# Patient Record
Sex: MALE | Race: WHITE
[De-identification: names, ages, dates, MRNs, and addresses within clinical notes are randomized per-mention and may not be internally consistent; named-entity substitution may affect disease eponyms.]

---

## 2017-01-16 ENCOUNTER — HOSPITAL ENCOUNTER (OUTPATIENT)
Dept: HOSPITAL 17 - PLAB | Age: 82
End: 2017-01-16
Attending: FAMILY MEDICINE
Payer: MEDICARE

## 2017-01-16 DIAGNOSIS — Z79.899: ICD-10-CM

## 2017-01-16 DIAGNOSIS — R53.83: ICD-10-CM

## 2017-01-16 DIAGNOSIS — I10: Primary | ICD-10-CM

## 2017-01-16 DIAGNOSIS — E03.8: ICD-10-CM

## 2017-01-16 DIAGNOSIS — E78.2: ICD-10-CM

## 2017-01-16 DIAGNOSIS — E55.9: ICD-10-CM

## 2017-01-16 LAB
ALP SERPL-CCNC: 132 U/L (ref 45–117)
ALT SERPL-CCNC: 17 U/L (ref 12–78)
ANION GAP SERPL CALC-SCNC: 8 MEQ/L (ref 5–15)
AST SERPL-CCNC: 17 U/L (ref 15–37)
BASOPHILS # BLD AUTO: 0 TH/MM3 (ref 0–0.2)
BASOPHILS NFR BLD: 0.2 % (ref 0–2)
BILIRUB SERPL-MCNC: 0.9 MG/DL (ref 0.2–1)
BUN SERPL-MCNC: 17 MG/DL (ref 7–18)
CHLORIDE SERPL-SCNC: 102 MEQ/L (ref 98–107)
EOSINOPHIL # BLD: 0.3 TH/MM3 (ref 0–0.4)
EOSINOPHIL NFR BLD: 3 % (ref 0–4)
ERYTHROCYTE [DISTWIDTH] IN BLOOD BY AUTOMATED COUNT: 14.2 % (ref 11.6–17.2)
GFR SERPLBLD BASED ON 1.73 SQ M-ARVRAT: 76 ML/MIN (ref 89–?)
HCO3 BLD-SCNC: 30.6 MEQ/L (ref 21–32)
HCT VFR BLD CALC: 42 % (ref 39–51)
HDLC SERPL-MCNC: 69.5 MG/DL (ref 40–60)
HEMO FLAGS: (no result)
HEMOGLOBIN A1A: 1 %
HEMOGLOBIN A1B: 1.5 %
HEMOGLOBIN AO: 85.6 %
HEMOGLOBIN LA1C: 2 %
HEMOGLOBIN P3: 3.7 %
LDLC SERPL-MCNC: 65 MG/DL (ref 0–99)
LYMPHOCYTES # BLD AUTO: 2.3 TH/MM3 (ref 1–4.8)
LYMPHOCYTES NFR BLD AUTO: 25.5 % (ref 9–44)
MCH RBC QN AUTO: 34.1 PG (ref 27–34)
MCHC RBC AUTO-ENTMCNC: 35.3 % (ref 32–36)
MCV RBC AUTO: 96.8 FL (ref 80–100)
MONOCYTES NFR BLD: 8 % (ref 0–8)
NEUTROPHILS # BLD AUTO: 5.7 TH/MM3 (ref 1.8–7.7)
NEUTROPHILS NFR BLD AUTO: 63.3 % (ref 16–70)
PLATELET # BLD: 205 TH/MM3 (ref 150–450)
POTASSIUM SERPL-SCNC: 4.5 MEQ/L (ref 3.5–5.1)
RBC # BLD AUTO: 4.34 MIL/MM3 (ref 4.5–5.9)
SODIUM SERPL-SCNC: 141 MEQ/L (ref 136–145)
T4 SERPL-MCNC: 7.8 MCG/DL (ref 4.5–12.1)
WBC # BLD AUTO: 9 TH/MM3 (ref 4–11)

## 2017-01-16 PROCEDURE — 84480 ASSAY TRIIODOTHYRONINE (T3): CPT

## 2017-01-16 PROCEDURE — 80053 COMPREHEN METABOLIC PANEL: CPT

## 2017-01-16 PROCEDURE — 84443 ASSAY THYROID STIM HORMONE: CPT

## 2017-01-16 PROCEDURE — 83036 HEMOGLOBIN GLYCOSYLATED A1C: CPT

## 2017-01-16 PROCEDURE — 36415 COLL VENOUS BLD VENIPUNCTURE: CPT

## 2017-01-16 PROCEDURE — 82306 VITAMIN D 25 HYDROXY: CPT

## 2017-01-16 PROCEDURE — 84436 ASSAY OF TOTAL THYROXINE: CPT

## 2017-01-16 PROCEDURE — 85025 COMPLETE CBC W/AUTO DIFF WBC: CPT

## 2017-01-16 PROCEDURE — 80061 LIPID PANEL: CPT

## 2017-04-18 ENCOUNTER — HOSPITAL ENCOUNTER (OUTPATIENT)
Dept: HOSPITAL 17 - PLAB | Age: 82
End: 2017-04-18
Attending: FAMILY MEDICINE
Payer: MEDICARE

## 2017-04-18 DIAGNOSIS — E78.2: ICD-10-CM

## 2017-04-18 DIAGNOSIS — E03.8: ICD-10-CM

## 2017-04-18 DIAGNOSIS — I10: Primary | ICD-10-CM

## 2017-04-18 DIAGNOSIS — R53.83: ICD-10-CM

## 2017-04-18 DIAGNOSIS — E55.9: ICD-10-CM

## 2017-04-18 DIAGNOSIS — Z79.899: ICD-10-CM

## 2017-04-18 LAB
ALP SERPL-CCNC: 109 U/L (ref 45–117)
ALT SERPL-CCNC: 21 U/L (ref 12–78)
ANION GAP SERPL CALC-SCNC: 6 MEQ/L (ref 5–15)
AST SERPL-CCNC: 17 U/L (ref 15–37)
BASOPHILS # BLD AUTO: 0.1 TH/MM3 (ref 0–0.2)
BASOPHILS NFR BLD: 0.9 % (ref 0–2)
BILIRUB SERPL-MCNC: 1.2 MG/DL (ref 0.2–1)
BUN SERPL-MCNC: 11 MG/DL (ref 7–18)
CHLORIDE SERPL-SCNC: 104 MEQ/L (ref 98–107)
EOSINOPHIL # BLD: 0.2 TH/MM3 (ref 0–0.4)
EOSINOPHIL NFR BLD: 2.4 % (ref 0–4)
ERYTHROCYTE [DISTWIDTH] IN BLOOD BY AUTOMATED COUNT: 13.9 % (ref 11.6–17.2)
GFR SERPLBLD BASED ON 1.73 SQ M-ARVRAT: 90 ML/MIN (ref 89–?)
HCO3 BLD-SCNC: 29.7 MEQ/L (ref 21–32)
HCT VFR BLD CALC: 41.3 % (ref 39–51)
HDLC SERPL-MCNC: 59.6 MG/DL (ref 40–60)
HEMO FLAGS: (no result)
LDLC SERPL-MCNC: 56 MG/DL (ref 0–99)
LYMPHOCYTES # BLD AUTO: 2.3 TH/MM3 (ref 1–4.8)
LYMPHOCYTES NFR BLD AUTO: 30.3 % (ref 9–44)
MCH RBC QN AUTO: 33.2 PG (ref 27–34)
MCHC RBC AUTO-ENTMCNC: 35.2 % (ref 32–36)
MCV RBC AUTO: 94.3 FL (ref 80–100)
MONOCYTES NFR BLD: 10 % (ref 0–8)
NEUTROPHILS # BLD AUTO: 4.4 TH/MM3 (ref 1.8–7.7)
NEUTROPHILS NFR BLD AUTO: 56.4 % (ref 16–70)
PLATELET # BLD: 186 TH/MM3 (ref 150–450)
POTASSIUM SERPL-SCNC: 4.1 MEQ/L (ref 3.5–5.1)
RBC # BLD AUTO: 4.38 MIL/MM3 (ref 4.5–5.9)
SODIUM SERPL-SCNC: 140 MEQ/L (ref 136–145)
T4 SERPL-MCNC: 7.2 MCG/DL (ref 4.5–12.1)
WBC # BLD AUTO: 7.7 TH/MM3 (ref 4–11)

## 2017-04-18 PROCEDURE — 80061 LIPID PANEL: CPT

## 2017-04-18 PROCEDURE — 82306 VITAMIN D 25 HYDROXY: CPT

## 2017-04-18 PROCEDURE — 80053 COMPREHEN METABOLIC PANEL: CPT

## 2017-04-18 PROCEDURE — 84436 ASSAY OF TOTAL THYROXINE: CPT

## 2017-04-18 PROCEDURE — 84443 ASSAY THYROID STIM HORMONE: CPT

## 2017-04-18 PROCEDURE — 84480 ASSAY TRIIODOTHYRONINE (T3): CPT

## 2017-04-18 PROCEDURE — 36415 COLL VENOUS BLD VENIPUNCTURE: CPT

## 2017-04-18 PROCEDURE — 85025 COMPLETE CBC W/AUTO DIFF WBC: CPT

## 2018-06-08 ENCOUNTER — HOSPITAL ENCOUNTER (INPATIENT)
Dept: HOSPITAL 17 - NEPC | Age: 83
LOS: 6 days | Discharge: NURSING, REHAB OR LONG-TERM CARE | DRG: 480 | End: 2018-06-14
Attending: FAMILY MEDICINE | Admitting: FAMILY MEDICINE
Payer: MEDICARE

## 2018-06-08 VITALS
OXYGEN SATURATION: 96 % | HEART RATE: 77 BPM | SYSTOLIC BLOOD PRESSURE: 139 MMHG | RESPIRATION RATE: 13 BRPM | DIASTOLIC BLOOD PRESSURE: 76 MMHG

## 2018-06-08 VITALS
RESPIRATION RATE: 18 BRPM | TEMPERATURE: 97.8 F | DIASTOLIC BLOOD PRESSURE: 63 MMHG | HEART RATE: 72 BPM | SYSTOLIC BLOOD PRESSURE: 131 MMHG | OXYGEN SATURATION: 97 %

## 2018-06-08 VITALS
SYSTOLIC BLOOD PRESSURE: 158 MMHG | DIASTOLIC BLOOD PRESSURE: 82 MMHG | RESPIRATION RATE: 20 BRPM | OXYGEN SATURATION: 95 % | HEART RATE: 69 BPM

## 2018-06-08 VITALS — BODY MASS INDEX: 27.47 KG/M2 | HEIGHT: 72 IN | WEIGHT: 202.83 LBS

## 2018-06-08 DIAGNOSIS — E03.9: ICD-10-CM

## 2018-06-08 DIAGNOSIS — F32.9: ICD-10-CM

## 2018-06-08 DIAGNOSIS — I10: ICD-10-CM

## 2018-06-08 DIAGNOSIS — S72.141A: Primary | ICD-10-CM
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
## 2018-06-08 DIAGNOSIS — I48.2: ICD-10-CM

## 2018-06-08 DIAGNOSIS — Z85.46: ICD-10-CM

## 2018-06-08 DIAGNOSIS — Z79.02: ICD-10-CM

## 2018-06-08 DIAGNOSIS — Z95.0: ICD-10-CM

## 2018-06-08 DIAGNOSIS — G93.41: ICD-10-CM
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
## 2018-06-08 DIAGNOSIS — Z96.611: ICD-10-CM

## 2018-06-08 DIAGNOSIS — G62.9: ICD-10-CM

## 2018-06-08 DIAGNOSIS — M19.90: ICD-10-CM

## 2018-06-08 LAB
BASOPHILS # BLD AUTO: 0 TH/MM3 (ref 0–0.2)
BASOPHILS NFR BLD: 0.2 % (ref 0–2)
BUN SERPL-MCNC: 15 MG/DL (ref 7–18)
CALCIUM SERPL-MCNC: 9 MG/DL (ref 8.5–10.1)
CHLORIDE SERPL-SCNC: 101 MEQ/L (ref 98–107)
CREAT SERPL-MCNC: 0.93 MG/DL (ref 0.6–1.3)
EOSINOPHIL # BLD: 0.2 TH/MM3 (ref 0–0.4)
EOSINOPHIL NFR BLD: 2.7 % (ref 0–4)
ERYTHROCYTE [DISTWIDTH] IN BLOOD BY AUTOMATED COUNT: 13.9 % (ref 11.6–17.2)
GFR SERPLBLD BASED ON 1.73 SQ M-ARVRAT: 77 ML/MIN (ref 89–?)
GLUCOSE SERPL-MCNC: 113 MG/DL (ref 74–106)
HCO3 BLD-SCNC: 32.5 MEQ/L (ref 21–32)
HCT VFR BLD CALC: 42.9 % (ref 39–51)
HGB BLD-MCNC: 14.6 GM/DL (ref 13–17)
INR PPP: 1.3 RATIO
LYMPHOCYTES # BLD AUTO: 2.3 TH/MM3 (ref 1–4.8)
LYMPHOCYTES NFR BLD AUTO: 31.8 % (ref 9–44)
MCH RBC QN AUTO: 33 PG (ref 27–34)
MCHC RBC AUTO-ENTMCNC: 34.1 % (ref 32–36)
MCV RBC AUTO: 96.8 FL (ref 80–100)
MONOCYTE #: 0.7 TH/MM3 (ref 0–0.9)
MONOCYTES NFR BLD: 9.7 % (ref 0–8)
NEUTROPHILS # BLD AUTO: 4 TH/MM3 (ref 1.8–7.7)
NEUTROPHILS NFR BLD AUTO: 55.6 % (ref 16–70)
PLATELET # BLD: 182 TH/MM3 (ref 150–450)
PMV BLD AUTO: 8.3 FL (ref 7–11)
PROTHROMBIN TIME: 12.7 SEC (ref 9.8–11.6)
RBC # BLD AUTO: 4.43 MIL/MM3 (ref 4.5–5.9)
SODIUM SERPL-SCNC: 141 MEQ/L (ref 136–145)
WBC # BLD AUTO: 7.2 TH/MM3 (ref 4–11)

## 2018-06-08 PROCEDURE — 95819 EEG AWAKE AND ASLEEP: CPT

## 2018-06-08 PROCEDURE — 82948 REAGENT STRIP/BLOOD GLUCOSE: CPT

## 2018-06-08 PROCEDURE — 82306 VITAMIN D 25 HYDROXY: CPT

## 2018-06-08 PROCEDURE — 72125 CT NECK SPINE W/O DYE: CPT

## 2018-06-08 PROCEDURE — 85014 HEMATOCRIT: CPT

## 2018-06-08 PROCEDURE — 99285 EMERGENCY DEPT VISIT HI MDM: CPT

## 2018-06-08 PROCEDURE — 72170 X-RAY EXAM OF PELVIS: CPT

## 2018-06-08 PROCEDURE — 80048 BASIC METABOLIC PNL TOTAL CA: CPT

## 2018-06-08 PROCEDURE — 81001 URINALYSIS AUTO W/SCOPE: CPT

## 2018-06-08 PROCEDURE — 73502 X-RAY EXAM HIP UNI 2-3 VIEWS: CPT

## 2018-06-08 PROCEDURE — 73552 X-RAY EXAM OF FEMUR 2/>: CPT

## 2018-06-08 PROCEDURE — 85730 THROMBOPLASTIN TIME PARTIAL: CPT

## 2018-06-08 PROCEDURE — 85018 HEMOGLOBIN: CPT

## 2018-06-08 PROCEDURE — 70450 CT HEAD/BRAIN W/O DYE: CPT

## 2018-06-08 PROCEDURE — 76000 FLUOROSCOPY <1 HR PHYS/QHP: CPT

## 2018-06-08 PROCEDURE — 85610 PROTHROMBIN TIME: CPT

## 2018-06-08 PROCEDURE — 93005 ELECTROCARDIOGRAM TRACING: CPT

## 2018-06-08 PROCEDURE — 85025 COMPLETE CBC W/AUTO DIFF WBC: CPT

## 2018-06-08 PROCEDURE — C1713 ANCHOR/SCREW BN/BN,TIS/BN: HCPCS

## 2018-06-08 RX ADMIN — LATANOPROST SCH DROP: 50 SOLUTION OPHTHALMIC at 22:58

## 2018-06-08 RX ADMIN — LEVOBUNOLOL HYDROCHLORIDE SCH DROP: 5 SOLUTION/ DROPS OPHTHALMIC at 22:59

## 2018-06-08 RX ADMIN — MIRTAZAPINE SCH MG: 15 TABLET, FILM COATED ORAL at 21:22

## 2018-06-08 NOTE — RADRPT
EXAM DATE:  6/8/2018 4:08 PM EDT

AGE/SEX:        89 years / Male



INDICATIONS:  Trauma, fall today.



CLINICAL DATA:  This is the patient's initial encounter. Patient reports that signs and symptoms have
 been present for 1 day and indicates a pain score of 5/10. 

                                                                          

MEDICAL/SURGICAL HISTORY:       Hypertension.  Carcinoma, prostatic. Appendectomy.



RADIATION DOSE:  23.21 CTDI (mGy)







COMPARISON:      No prior exams available for comparison. 





TECHNIQUE:  Contiguous axial images were obtained using helical multirow detector technique.  The vol
umetric data was post-processed with multiplanar reconstruction in oblique axial, sagittal, and coron
al planes. Using automated exposure control and adjustment of the mA and/or kV according to patient s
ize, radiation dose was kept as low as reasonably achievable to obtain optimal diagnostic quality shilpa
ges.



FINDINGS: 

No fracture or dislocation. Diffuse degenerative changes throughout the entire cervical spine. This i
ncludes multilevel mild broad-based disc osteophyte complexes as well as prominent bony uncovertebral
 hypertrophy with bilateral neural foraminal narrowing. Calcified plaque involving the carotid arteri
es.



CONCLUSION:

1.     No fracture or dislocation.

2.  Pronounced diffuse degenerative changes.



Electronically signed by: Jaren Banuelos MD  6/8/2018 4:13 PM EDT

## 2018-06-08 NOTE — RADRPT
EXAM DATE:  6/8/2018 3:53 PM EDT

AGE/SEX:        89 years / Male



INDICATIONS:  Pain from fall in right hip.



CLINICAL DATA:  This is the patient's initial encounter. Patient reports that signs and symptoms have
 been present for 1 day and indicates a pain score of 10/10. 

                                                                          

MEDICAL/SURGICAL HISTORY:       None. None.



COMPARISON:      No prior exams available for comparison. 





FINDINGS:  

There is a complete intertrochanteric fracture on the right side with avulsion of the lesser trochant
er. Chronic vascular calcifications are seen.



CONCLUSION: 

Intertrochanteric fracture.









Electronically signed by: GWENDOLYN Norwood MD  6/8/2018 4:07 PM EDT

## 2018-06-08 NOTE — PD
HPI


Chief Complaint:  Fall


Time Seen by Provider:  15:07


Travel History


International Travel<30 days:  No


Contact w/Intl Traveler<30days:  No


Traveled to known affect area:  No





History of Present Illness


HPI


Patient comes in for evaluation of right hip pain status post mechanical fall 

while leaving [employer] today after lunch.  Patient reports he was walking 

to his car when he misstepped on the sidewalk causing him to fall landing on 

his right hip.  Patient does report and he has head slightly, but denies loss 

of consciousness.  Patient is on Xarelto for A. fib.  Denies any headache, 

change in vision, chest pain, shortness of breath, neck pain, or abdominal 

pain.  Patient complaining of pain in his right hip describes as a achy 

soreness that radiates distally.  Pain is worse with movement of his right hip.

  Patient received 4 mg of morphine in route by EMS that seemed to help his 

pain.





PFSH


Past Medical History


Atrial Fibrillation:  Yes


Blood Disorders:  No


Anxiety:  No


Depression:  No


Heart Rhythm Problems:  Yes (A-FIB)


Cancer:  Yes (PROSTATE CA)


Cardiovascular Problems:  Yes (PACE, A-FIB)


High Cholesterol:  No


Chemotherapy:  No


Chest Pain:  No


Congestive Heart Failure:  No


Diabetes:  No


Diminished Hearing:  No


Endocrine:  Yes (THYROID)


Gastrointestinal Disorders:  Yes (constipation)


Genitourinary:  Yes (PROSTATE CA)


Hepatitis:  No


Hiatal Hernia:  No


Hypertension:  Yes


Immune Disorder:  No


Musculoskeletal:  Yes (FX RIGHT SCAPULA, ARTHRITIS, HIP/KNEE PAIN)


Neurologic:  Yes (BLE NEUROPATHY)


Psychiatric:  No


Reproductive:  No


Respiratory:  No


Immunizations Current:  No


Radiation Therapy:  No


Thyroid Disease:  Yes





Past Surgical History


Abdominal Surgery:  Yes (cici inguinal hernia x4   appendectomy)


AICD:  No


Body Medical Devices:  RIGHT SHOULDER, MESH TO LEFT GROIN, PACEMAKER, EYE LENS


Cardiac Surgery:  Yes (pacemaker x2)


Endocrine Surgery:  No


Eye Surgery:  Yes (cici cataract removal   left eyelid repair)


Genitourinary Surgery:  Yes (prostatectomy)


Joint Replacement:  Yes (RIGHT SHOULDER REPLACEMENT)


Oral Surgery:  Yes (t and a)


Pacemaker:  Yes (MEDTRONIC)


Thoracic Surgery:  Yes (PACEMAKER -- 2004, 2012)


Other Surgery:  Yes (PM)





Social History


Alcohol Use:  Yes (WINE DAILY)


Tobacco Use:  No


Substance Use:  Yes





Allergies-Medications


(Allergen,Severity, Reaction):  


Coded Allergies:  


     fluoxetine (Unverified  Allergy, Severe, hallucinations, 8/15/17)


 along with tramadol caused hallucinations


     levobunolol (Unverified  Allergy, Severe, UNKNOWN-ON PTS HISTORY AND 

PHYSICAL, 8/15/17)


     tramadol (Unverified  Allergy, Severe, hallucinations, 8/15/17)


 along with prozac caused hallucinations


     azithromycin (Unverified  Allergy, Mild, rash, 8/15/17)


 hives


Reported Meds & Prescriptions





Reported Meds & Active Scripts


Active


Reported


Ibuprofen 200 Mg Tab 200-400 Mg PO Q4H PRN


Stool Softener (Docusate Sodium) 100 Mg Tab 100 Mg PO DAILY


Dulcolax Supp (Bisacodyl) 10 Mg Supp 10 Mg RECTAL DAILY


Remeron (Mirtazapine) 15 Mg Tab 15 Mg PO HS


Vitamin D3 (Cholecalciferol) 2,000 Unit Cap 2,000 Units PO DAILY


Vitamin B-12 (Cyanocobalamin) 1,000 Mcg Tab 2,000 Mcg PO DAILY


Preservision Areds 2 Softgel (Vit C/E/Zn/Coppr/Lutein/Zeaxan) 250-200-40 

Capsule 1 Cap PO DAILY


Lexapro (Escitalopram Oxalate) 20 Mg Tab 20 Mg PO DAILY


Latanoprost Opth Drops (Latanoprost) 0.005% Drops 1 Drop EACH EYE HS


     Refrigerate until opened.


Levobunolol Opth Drops (Levobunolol HCl) 0.5% Drops 1 Drop EACH EYE BID


Potassium Chloride ER (Potassium Chloride) 10 Meq Tab 10 Meq PO DAILY


Lasix (Furosemide) 20 Mg Tab 20 Mg PO DAILY AT 12NOON


Lasix (Furosemide) 40 Mg Tab 40 Mg PO DAILY IN THE AM


Diltiazem ER 12 HR (Diltiazem HCl) 60 Mg Caper 60 Mg PO DAILY


[place] Thyroid (Thyroid) 15 Mg Tab 135 Mg PO DAILY


     Take 1 tablet (15mg) with a 120mg tablet for a total dose of 135mg


[place] Thyroid (Thyroid) 120 Mg Tab 135 Mg PO DAILY


     Take 1 tablet (120mg) with a 15mg tablet for a total dose of 135mg


Xarelto (Rivaroxaban) 20 Mg Tab 20 Mg PO DAILY








Review of Systems


Except as stated in HPI:  all other systems reviewed are Neg





Physical Exam


Narrative


GENERAL: Well-developed, overly nourished, in no acute distress, and non-ill 

appearing.


SKIN: Focused skin assessment warm and dry.


HEAD: Atraumatic. Normocephalic. 


EYES: Pupils equal and round. EOMI. No scleral icterus. No injection or 

drainage. 


ENT: No nasal bleeding or discharge.  Mucous membranes pink and moist.


NECK: Trachea midline. Supple.  No nuclear rigidity.


CARDIOVASCULAR: Dorsal pulses 2+, intact, and equal bilaterally.  Capillary 

refill less than 2 seconds.


RESPIRATORY: No accessory muscle use.  No respiratory distress. 


MUSCULOSKELETAL: No obvious deformities. No clubbing.  No cyanosis.  No edema.  

Decreased range of motion right hip secondary pain.  Right hip is externally 

rotated leg is shortened.  Patient reports pain right hip with passive movement 

of right lower extremity.  Hip: Pulses equal BL distal to injury. Capillary 

refill less than 2 seconds distal to injury and equal BL. FROM distal to injury 

and equal BL.  Strength distal to injury equal BL. NV intact distal to injury 

and equal BL. Plantar flexion and dorsal flexion equal BL. Dorsal pulses equal 

BL. Sensation equal BL 1st web space.


NEUROLOGICAL: Awake and alert. No obvious cranial nerve deficits.  Motor 

grossly within normal limits. Normal speech.


PSYCHIATRIC: Appropriate mood and affect; insight and judgment normal.





Data


Data


Last Documented VS





Vital Signs








  Date Time  Temp Pulse Resp B/P (MAP) Pulse Ox O2 Delivery O2 Flow Rate FiO2


 


6/8/18 15:05  69 20 158/82 (107) 95   








Orders





 Orders


Basic Metabolic Panel (Bmp) (6/8/18 15:07)


Complete Blood Count With Diff (6/8/18 15:07)


Prothrombin Time / Inr (Pt) (6/8/18 15:07)


Act Partial Throm Time (Ptt) (6/8/18 15:07)


Iv Access Insert/Monitor (6/8/18 15:07)


Ecg Monitoring (6/8/18 15:07)


Oximetry (6/8/18 15:07)


Sodium Chloride 0.9% Flush (Ns Flush) (6/8/18 15:15)


Pelvis, Ap Only (Routine) (6/8/18 )


Femur (Ap & Lat/2vws) (6/8/18 )


Ct Brain W/O Iv Contrast(Rout) (6/8/18 )


Ct Cerv Spine W/O Contrast (6/8/18 )


Admit Order (Ed Use Only) (6/8/18 17:11)


Morphine Inj (Morphine Inj) (6/8/18 17:15)





Labs





Laboratory Tests








Test


  6/8/18


15:15


 


White Blood Count 7.2 TH/MM3 


 


Red Blood Count 4.43 MIL/MM3 


 


Hemoglobin 14.6 GM/DL 


 


Hematocrit 42.9 % 


 


Mean Corpuscular Volume 96.8 FL 


 


Mean Corpuscular Hemoglobin 33.0 PG 


 


Mean Corpuscular Hemoglobin


Concent 34.1 % 


 


 


Red Cell Distribution Width 13.9 % 


 


Platelet Count 182 TH/MM3 


 


Mean Platelet Volume 8.3 FL 


 


Neutrophils (%) (Auto) 55.6 % 


 


Lymphocytes (%) (Auto) 31.8 % 


 


Monocytes (%) (Auto) 9.7 % 


 


Eosinophils (%) (Auto) 2.7 % 


 


Basophils (%) (Auto) 0.2 % 


 


Neutrophils # (Auto) 4.0 TH/MM3 


 


Lymphocytes # (Auto) 2.3 TH/MM3 


 


Monocytes # (Auto) 0.7 TH/MM3 


 


Eosinophils # (Auto) 0.2 TH/MM3 


 


Basophils # (Auto) 0.0 TH/MM3 


 


CBC Comment DIFF FINAL 


 


Differential Comment  


 


Prothrombin Time 12.7 SEC 


 


Prothromb Time International


Ratio 1.3 RATIO 


 


 


Activated Partial


Thromboplast Time 27.2 SEC 


 


 


Blood Urea Nitrogen 15 MG/DL 


 


Creatinine 0.93 MG/DL 


 


Random Glucose 113 MG/DL 


 


Calcium Level 9.0 MG/DL 


 


Sodium Level 141 MEQ/L 


 


Potassium Level 4.4 MEQ/L 


 


Chloride Level 101 MEQ/L 


 


Carbon Dioxide Level 32.5 MEQ/L 


 


Anion Gap 8 MEQ/L 


 


Estimat Glomerular Filtration


Rate 77 ML/MIN 


 











MDM


Medical Decision Making


Medical Screen Exam Complete:  Yes


Emergency Medical Condition:  Yes


Interpretation(s)





Last Impressions








Pelvis X-Ray 6/8/18 0000 Signed





Impressions: 





 CONCLUSION:





    Acute intertrochanteric right hip fracture.





  





 


 


Head CT 6/8/18 0000 Signed





Impressions: 





 CONCLUSION:





 1.  Negative CT Head non contrast.





  





 


 


Femur X-Ray 6/8/18 0000 Signed





Impressions: 





 CONCLUSION: 





 Intertrochanteric fracture.





  





 





  





 





  





 





  





 


 


Cervical Spine CT 6/8/18 0000 Signed





Impressions: 





 CONCLUSION:





 1.     No fracture or dislocation.





 2.  Pronounced diffuse degenerative changes.





  





 








Differential Diagnosis


Fracture, dislocation, contusion, strain


Narrative Course


Patient seen and examined.  Initial laboratory radiological studies were 

ordered.  IV was established patient was placed on continuous cardiac 

monitoring.  Additional dose of 2 mg IV morphine was given as patient reports 

pain is coming back.  Discussed all findings and plan of care with patient is 

agreeable for admission.  All questions were answered.  Discussed patient with 

Dr. Naranjo, who is in agreement plan of care and disposition.  Discussed 

patient with Dr. Juárez, who is agreeable to admit the patient.  Discussed 

patient with orthopedics is agreeable to consult on patient.  Patient remained 

stable throughout ED course.





Physician Communication


Physician Communication


1710 discussed patient with Dr. Juárez, who is agreeable to admit the patient.





1716 spoke with [place] over nurse is Dr. García and his PA were both scrubbed 

and reports Dr. García's awhere of the patient and wants him n.p.o. after 

midnight and admit to medicine.





Diagnosis





 Primary Impression:  


 Intertrochanteric fracture of right hip


 Qualified Codes:  S72.144A - Nondisplaced intertrochanteric fracture of right 

femur, initial encounter for closed fracture





Admitting Information


Admitting Physician Requests:  Admit


Condition:  Stable











Hardeep Mackenzie Jun 8, 2018 15:11

## 2018-06-08 NOTE — RADRPT
EXAM DATE:  6/8/2018 3:55 PM EDT

AGE/SEX:        89 years / Male



INDICATIONS:  Pain from fall in right hip.



CLINICAL DATA:  This is the patient's initial encounter. Patient reports that signs and symptoms have
 been present for 1 day and indicates a pain score of 10/10. 

                                                                          

MEDICAL/SURGICAL HISTORY:       None. . Bladder surgery.



COMPARISON:      No prior exams available for comparison. 





FINDINGS:  

Examination of the pelvis demonstrates an acute nondisplaced intertrochanteric right hip fracture. No
 angulation or distraction. Left hip is intact as is the rest of the pelvis. Osteopenia noted. Multip
le surgical clips within the pelvis.



CONCLUSION:

   Acute intertrochanteric right hip fracture.



Electronically signed by: Jaren Banuelos MD  6/8/2018 4:05 PM EDT

## 2018-06-08 NOTE — RADRPT
EXAM DATE:  6/8/2018 4:06 PM EDT

AGE/SEX:        89 years / Male



INDICATIONS:  Trauma, fall today.



CLINICAL DATA:  This is the patient's initial encounter. Patient reports that signs and symptoms have
 been present for 1 day and indicates a pain score of 4/10. 

                                                                          

MEDICAL/SURGICAL HISTORY:   Hypertension.  Carcinoma, prostatic. Appendectomy.



RADIATION DOSE:  40.92 CTDI (mGy)







COMPARISON:      [facility] [place] BRAIN W/O CONTRAST, 3/8/2016.  . 







TECHNIQUE:  CT of the head without contrast.  Using automated exposure control and adjustment of the 
mA and/or kV according to patient size, radiation dose was kept as low as reasonably achievable to ob
tain optimal diagnostic quality images.



FINDINGS: 

Cerebrum:  The ventricles are normal for age.  No evidence of midline shift, mass lesion, hemorrhage 
or acute infarction.  No extraaxial fluid collections are seen.

Posterior Fossa:  The cerebellum and brainstem are intact.  The 4th ventricle is midline.  The cerebe
llopontine angle is unremarkable.

Extracranial:  The visualized portion of the orbits is intact.

Skull:  The calvaria is intact.  No evidence of skull fracture.



CONCLUSION:

1.  Negative CT Head non contrast.



Electronically signed by: Jaren Banuelos MD  6/8/2018 4:08 PM EDT

## 2018-06-08 NOTE — PD
Physical Exam


Date Seen by Provider:  Jun 8, 2018


Time Seen by Provider:  16:00


Narrative


This is a 89-year-old male who presents after having a mechanical fall.  I am 

seeing this patient with Jorge Mackenzie PA-C.  Patient apparently was 

finishing eating at [employer] when he was walking on the parking lot and 

tripped and fell.  He reports hip pain.  He also bumped his head.  There was no 

reported loss of consciousness however the patient is on Xarelto.





Data


Data


Last Documented VS





Vital Signs








  Date Time  Temp Pulse Resp B/P (MAP) Pulse Ox O2 Delivery O2 Flow Rate FiO2


 


6/8/18 15:05  69 20 158/82 (107) 95   








Orders





 Orders


Basic Metabolic Panel (Bmp) (6/8/18 15:07)


Complete Blood Count With Diff (6/8/18 15:07)


Prothrombin Time / Inr (Pt) (6/8/18 15:07)


Act Partial Throm Time (Ptt) (6/8/18 15:07)


Iv Access Insert/Monitor (6/8/18 15:07)


Ecg Monitoring (6/8/18 15:07)


Oximetry (6/8/18 15:07)


Sodium Chloride 0.9% Flush (Ns Flush) (6/8/18 15:15)


Pelvis, Ap Only (Routine) (6/8/18 )


Femur (Ap & Lat/2vws) (6/8/18 )


Ct Brain W/O Iv Contrast(Rout) (6/8/18 )


Ct Cerv Spine W/O Contrast (6/8/18 )





Labs





Laboratory Tests








Test


  6/8/18


15:15


 


White Blood Count 7.2 TH/MM3 


 


Red Blood Count 4.43 MIL/MM3 


 


Hemoglobin 14.6 GM/DL 


 


Hematocrit 42.9 % 


 


Mean Corpuscular Volume 96.8 FL 


 


Mean Corpuscular Hemoglobin 33.0 PG 


 


Mean Corpuscular Hemoglobin


Concent 34.1 % 


 


 


Red Cell Distribution Width 13.9 % 


 


Platelet Count 182 TH/MM3 


 


Mean Platelet Volume 8.3 FL 


 


Neutrophils (%) (Auto) 55.6 % 


 


Lymphocytes (%) (Auto) 31.8 % 


 


Monocytes (%) (Auto) 9.7 % 


 


Eosinophils (%) (Auto) 2.7 % 


 


Basophils (%) (Auto) 0.2 % 


 


Neutrophils # (Auto) 4.0 TH/MM3 


 


Lymphocytes # (Auto) 2.3 TH/MM3 


 


Monocytes # (Auto) 0.7 TH/MM3 


 


Eosinophils # (Auto) 0.2 TH/MM3 


 


Basophils # (Auto) 0.0 TH/MM3 


 


CBC Comment DIFF FINAL 


 


Differential Comment  


 


Prothrombin Time 12.7 SEC 


 


Prothromb Time International


Ratio 1.3 RATIO 


 


 


Activated Partial


Thromboplast Time 27.2 SEC 


 


 


Blood Urea Nitrogen 15 MG/DL 


 


Creatinine 0.93 MG/DL 


 


Random Glucose 113 MG/DL 


 


Calcium Level 9.0 MG/DL 


 


Sodium Level 141 MEQ/L 


 


Potassium Level 4.4 MEQ/L 


 


Chloride Level 101 MEQ/L 


 


Carbon Dioxide Level 32.5 MEQ/L 


 


Anion Gap 8 MEQ/L 


 


Estimat Glomerular Filtration


Rate 77 ML/MIN 


 











MDM


Medical Record Reviewed:  Yes


Supervised Visit with GABRIELA:  Yes


Differential Diagnosis


Fracture versus contusion versus dislocation


Narrative Course


89-year-old male presents after having a mechanical fall while tripping in the 

parking lot after eating narrow.  The patient has a right intertrochanteric 

fracture.  He will be admitted to his primary care physician, Dr. Joseph hooker.

  The case was discussed with the orthopedic physician who will see the patient 

in consultation.  He will need to have his anticoagulation stopped in order to 

have the surgery.  The patient will be placed in Cast's traction.


Diagnosis





 Primary Impression:  


 Intertrochanteric fracture of right hip


 Additional Impressions:  


 Afib


 Anticoagulated


 History of pacemaker





Admitting Information


Admitting Physician Requests:  Admit











Jordan Naranjo MD Jun 8, 2018 17:06

## 2018-06-08 NOTE — HHI.HP
History of Present Illness


Primary Care Physician


Cristian Juárez, DO


Admission Diagnosis





Right hip fracture


Diagnoses:  


History of Present Illness


fell in parking lot





Review of Systems


Constitutional:  COMPLAINS OF: Dizziness


Musculoskeletal:  COMPLAINS OF: Joint pain





Past Family Social History


Allergies:  


Coded Allergies:  


     fluoxetine (Unverified  Allergy, Severe, hallucinations, 8/15/17)


 along with tramadol caused hallucinations


     levobunolol (Unverified  Allergy, Severe, UNKNOWN-ON PTS HISTORY AND 

PHYSICAL, 8/15/17)


     tramadol (Unverified  Allergy, Severe, hallucinations, 8/15/17)


 along with prozac caused hallucinations


     azithromycin (Unverified  Allergy, Mild, rash, 8/15/17)


 hives


Reported Medications





Reported Meds & Active Scripts


Active


Reported


Stool Softener (Docusate Sodium) 100 Mg Tab 100 Mg PO DAILY


Dulcolax Supp (Bisacodyl) 10 Mg Supp 10 Mg RECTAL DAILY


Remeron (Mirtazapine) 15 Mg Tab 15 Mg PO HS


Vitamin D3 (Cholecalciferol) 2,000 Unit Cap 2,000 Units PO DAILY


Vitamin B-12 (Cyanocobalamin) 1,000 Mcg Tab 2,000 Mcg PO DAILY


Preservision Areds 2 Softgel (Vit C/E/Zn/Coppr/Lutein/Zeaxan) 250-200-40 

Capsule 1 Cap PO DAILY


Lexapro (Escitalopram Oxalate) 20 Mg Tab 20 Mg PO DAILY


Latanoprost Opth Drops (Latanoprost) 0.005% Drops 1 Drop EACH EYE HS


     Refrigerate until opened.


Levobunolol Opth Drops (Levobunolol HCl) 0.5% Drops 1 Drop EACH EYE BID


Potassium Chloride ER (Potassium Chloride) 10 Meq Tab 10 Meq PO DAILY


Lasix (Furosemide) 20 Mg Tab 20 Mg PO DAILY AT 12NOON


Lasix (Furosemide) 40 Mg Tab 40 Mg PO DAILY IN THE AM


Diltiazem ER 12 HR (Diltiazem HCl) 60 Mg Caper 60 Mg PO DAILY


[place] Thyroid (Thyroid) 15 Mg Tab 135 Mg PO DAILY


     Take 1 tablet (15mg) with a 120mg tablet for a total dose of 135mg


[place] Thyroid (Thyroid) 120 Mg Tab 135 Mg PO DAILY


     Take 1 tablet (120mg) with a 15mg tablet for a total dose of 135mg


Xarelto (Rivaroxaban) 20 Mg Tab 20 Mg PO DAILY


Active Ordered Medications


depression  a fib  hptn djd


Family History


father  in old age  mother  of cancer


Social History


non smoker   occ wine drinker





Physical Exam


Vital Signs





Vital Signs








  Date Time  Temp Pulse Resp B/P (MAP) Pulse Ox O2 Delivery O2 Flow Rate FiO2


 


18 15:05  69 20 158/82 (107) 95   








Physical Exam


GENERAL: This is a well-nourished, well-developed patient, in no apparent 

distress.


SKIN: No rashes, ecchymoses or lesions. Cool and dry.


HEAD: Atraumatic. Normocephalic. No temporal or scalp tenderness.


EYES: Pupils equal round and reactive. Extraocular motions intact. No scleral 

icterus. No injection or drainage. 


ENT: Nose without bleeding, purulent drainage or septal hematoma. Throat 

without erythema, tonsillar hypertrophy or exudate. Uvula midline. Airway 

patent.


NECK: Trachea midline. No JVD or lymphadenopathy. Supple, nontender, no 

meningeal signs.


CARDIOVASCULAR: Regular rate and rhythm without murmurs, gallops, or rubs. 


RESPIRATORY: Clear to auscultation. Breath sounds equal bilaterally. No wheezes

, rales, or rhonchi.  


GASTROINTESTINAL: Abdomen soft, non-tender, nondistended. No hepato-splenomegaly

, or palpable masses. No guarding.


MUSCULOSKELETAL: Extremities without clubbing, cyanosis, or edema. r hip 

painful  r shoulder with old surgery scars


NEUROLOGICAL: Awake and alert. Cranial nerves II through XII intact.  Motor and 

sensory grossly within normal limits. Five out of 5 muscle strength in all 

muscle groups.  Normal speech.


Laboratory





Laboratory Tests








Test


  18


15:15


 


White Blood Count 7.2 


 


Red Blood Count 4.43 


 


Hemoglobin 14.6 


 


Hematocrit 42.9 


 


Mean Corpuscular Volume 96.8 


 


Mean Corpuscular Hemoglobin 33.0 


 


Mean Corpuscular Hemoglobin


Concent 34.1 


 


 


Red Cell Distribution Width 13.9 


 


Platelet Count 182 


 


Mean Platelet Volume 8.3 


 


Neutrophils (%) (Auto) 55.6 


 


Lymphocytes (%) (Auto) 31.8 


 


Monocytes (%) (Auto) 9.7 


 


Eosinophils (%) (Auto) 2.7 


 


Basophils (%) (Auto) 0.2 


 


Neutrophils # (Auto) 4.0 


 


Lymphocytes # (Auto) 2.3 


 


Monocytes # (Auto) 0.7 


 


Eosinophils # (Auto) 0.2 


 


Basophils # (Auto) 0.0 


 


CBC Comment DIFF FINAL 


 


Differential Comment  


 


Prothrombin Time 12.7 


 


Prothromb Time International


Ratio 1.3 


 


 


Activated Partial


Thromboplast Time 27.2 


 


 


Blood Urea Nitrogen 15 


 


Creatinine 0.93 


 


Random Glucose 113 


 


Calcium Level 9.0 


 


Sodium Level 141 


 


Potassium Level 4.4 


 


Chloride Level 101 


 


Carbon Dioxide Level 32.5 


 


Anion Gap 8 


 


Estimat Glomerular Filtration


Rate 77 


 








Result Diagram:  


18 1515                                                                    

            18 1515





Imaging





Last Impressions








Pelvis X-Ray 18 0000 Signed





Impressions: 





 CONCLUSION:





    Acute intertrochanteric right hip fracture.





  





 


 


Head CT 18 0000 Signed





Impressions: 





 CONCLUSION:





 1.  Negative CT Head non contrast.





  





 


 


Femur X-Ray 18 0000 Signed





Impressions: 





 CONCLUSION: 





 Intertrochanteric fracture.





  





 





  





 





  





 





  





 


 


Cervical Spine CT 18 0000 Signed





Impressions: 





 CONCLUSION:





 1.     No fracture or dislocation.





 2.  Pronounced diffuse degenerative changes.





  





 











Caprini VTE Risk Assessment


Caprini VTE Risk Assessment:  No/Low Risk (score <= 1)


Caprini Risk Assessment Model











 Point Value = 1          Point Value = 2  Point Value = 3  Point Value = 5


 


Age 41-60


Minor surgery


BMI > 25 kg/m2


Swollen legs


Varicose veins


Pregnancy or postpartum


History of unexplained or recurrent


   spontaneous 


Oral contraceptives or hormone


   replacement


Sepsis (< 1 month)


Serious lung disease, including


   pneumonia (< 1 month)


Abnormal pulmonary function


Acute myocardial infarction


Congestive heart failure (< 1 month)


History of inflammatory bowel disease


Medical patient at bed rest Age 61-74


Arthroscopic surgery


Major open surgery (> 45 min)


Laparoscopic surgery (> 45 min)


Malignancy


Confined to bed (> 72 hours)


Immobilizing plaster cast


Central venous access Age >= 75


History of VTE


Family history of VTE


Factor V Leiden


Prothrombin 27952H


Lupus anticoagulant


Anticardiolipin antibodies


Elevated serum homocysteine


Heparin-induced thrombocytopenia


Other congenital or acquired


   thrombophilia Stroke (< 1 month)


Elective arthroplasty


Hip, pelvis, or leg fracture


Acute spinal cord injury (< 1 month)








Prophylaxis Regimen











   Total Risk


Factor Score Risk Level Prophylaxis Regimen


 


0-1      Low Early ambulation


 


2 Moderate Order ONE of the following:


*Sequential Compression Device (SCD)


*Heparin 5000 units SQ BID


 


3-4 Higher Order ONE of the following medications:


*Heparin 5000 units SQ TID


*Enoxaparin/Lovenox 40 mg SQ daily (WT < 150 kg, CrCl > 30 mL/min)


*Enoxaparin/Lovenox 30 mg SQ daily (WT < 150 kg, CrCl > 10-29 mL/min)


*Enoxaparin/Lovenox 30 mg SQ BID (WT < 150 kg, CrCl > 30 mL/min)


AND/OR


*Sequential Compression Device (SCD)


 


5 or more Highest Order ONE of the following medications:


*Heparin 5000 units SQ TID (Preferred with Epidurals)


*Enoxaparin/Lovenox 40 mg SQ daily (WT < 150 kg, CrCl > 30 mL/min)


*Enoxaparin/Lovenox 30 mg SQ daily (WT < 150 kg, CrCl > 10-29 mL/min)


*Enoxaparin/Lovenox 30 mg SQ BID (WT < 150 kg, CrCl > 30 mL/min)


AND


*Sequential Compression Device (SCD)











Assessment and Plan


Problem List:  


(1) Intertrochanteric fracture of right hip


ICD Codes:  S72.141A - Displaced intertrochanteric fracture of right femur, 

initial encounter for closed fracture


Status:  Acute


Plan:  consult ortho for afib





(2) Afib


ICD Codes:  I48.91 - Unspecified atrial fibrillation


Status:  Acute


Plan:  consult cardiology





(3) History of pacemaker


ICD Codes:  Z95.0 - Presence of cardiac pacemaker


Status:  Acute


Plan:  consult cardiology








Problem Qualifiers





(1) Intertrochanteric fracture of right hip:  


Qualified Codes:  S72.144A - Nondisplaced intertrochanteric fracture of right 

femur, initial encounter for closed fracture








Cristian Juárez DO 2018 17:40

## 2018-06-09 VITALS
RESPIRATION RATE: 18 BRPM | SYSTOLIC BLOOD PRESSURE: 119 MMHG | HEART RATE: 72 BPM | OXYGEN SATURATION: 96 % | TEMPERATURE: 98.5 F | DIASTOLIC BLOOD PRESSURE: 63 MMHG

## 2018-06-09 VITALS
RESPIRATION RATE: 18 BRPM | TEMPERATURE: 98.5 F | SYSTOLIC BLOOD PRESSURE: 137 MMHG | HEART RATE: 63 BPM | OXYGEN SATURATION: 95 % | DIASTOLIC BLOOD PRESSURE: 78 MMHG

## 2018-06-09 VITALS
TEMPERATURE: 98.5 F | OXYGEN SATURATION: 96 % | SYSTOLIC BLOOD PRESSURE: 112 MMHG | RESPIRATION RATE: 20 BRPM | DIASTOLIC BLOOD PRESSURE: 69 MMHG | HEART RATE: 63 BPM

## 2018-06-09 VITALS
SYSTOLIC BLOOD PRESSURE: 124 MMHG | OXYGEN SATURATION: 95 % | RESPIRATION RATE: 18 BRPM | TEMPERATURE: 98.2 F | DIASTOLIC BLOOD PRESSURE: 67 MMHG | HEART RATE: 65 BPM

## 2018-06-09 VITALS
TEMPERATURE: 98.3 F | OXYGEN SATURATION: 95 % | DIASTOLIC BLOOD PRESSURE: 57 MMHG | RESPIRATION RATE: 18 BRPM | HEART RATE: 67 BPM | SYSTOLIC BLOOD PRESSURE: 111 MMHG

## 2018-06-09 VITALS
HEART RATE: 73 BPM | DIASTOLIC BLOOD PRESSURE: 64 MMHG | TEMPERATURE: 99.1 F | RESPIRATION RATE: 20 BRPM | SYSTOLIC BLOOD PRESSURE: 132 MMHG | OXYGEN SATURATION: 96 %

## 2018-06-09 LAB
INR PPP: 1.2 RATIO
PROTHROMBIN TIME: 12.2 SEC (ref 9.8–11.6)

## 2018-06-09 RX ADMIN — POTASSIUM CHLORIDE SCH MEQ: 750 TABLET, FILM COATED, EXTENDED RELEASE ORAL at 09:44

## 2018-06-09 RX ADMIN — LEVOBUNOLOL HYDROCHLORIDE SCH DROP: 5 SOLUTION/ DROPS OPHTHALMIC at 09:42

## 2018-06-09 RX ADMIN — MORPHINE SULFATE PRN MG: 2 INJECTION, SOLUTION INTRAMUSCULAR; INTRAVENOUS at 10:41

## 2018-06-09 RX ADMIN — CYANOCOBALAMIN TAB 1000 MCG SCH MCG: 1000 TAB at 09:00

## 2018-06-09 RX ADMIN — FUROSEMIDE SCH MG: 20 TABLET ORAL at 09:43

## 2018-06-09 RX ADMIN — LEVOBUNOLOL HYDROCHLORIDE SCH DROP: 5 SOLUTION/ DROPS OPHTHALMIC at 21:01

## 2018-06-09 RX ADMIN — MORPHINE SULFATE PRN MG: 2 INJECTION, SOLUTION INTRAMUSCULAR; INTRAVENOUS at 21:01

## 2018-06-09 RX ADMIN — DOCUSATE SODIUM SCH MG: 100 CAPSULE, LIQUID FILLED ORAL at 09:00

## 2018-06-09 RX ADMIN — MORPHINE SULFATE PRN MG: 2 INJECTION, SOLUTION INTRAMUSCULAR; INTRAVENOUS at 06:37

## 2018-06-09 RX ADMIN — THYROID, PORCINE SCH MG: 15 TABLET ORAL at 09:00

## 2018-06-09 RX ADMIN — VITAMIN D, TAB 1000IU (100/BT) SCH UNITS: 25 TAB at 09:43

## 2018-06-09 RX ADMIN — MIRTAZAPINE SCH MG: 15 TABLET, FILM COATED ORAL at 21:00

## 2018-06-09 RX ADMIN — MORPHINE SULFATE PRN MG: 2 INJECTION, SOLUTION INTRAMUSCULAR; INTRAVENOUS at 12:50

## 2018-06-09 RX ADMIN — OXYTOCIN PRN MLS/HR: 10 INJECTION, SOLUTION INTRAMUSCULAR; INTRAVENOUS at 06:16

## 2018-06-09 RX ADMIN — LATANOPROST SCH DROP: 50 SOLUTION OPHTHALMIC at 21:01

## 2018-06-09 RX ADMIN — BISACODYL SCH MG: 10 SUPPOSITORY RECTAL at 09:00

## 2018-06-09 RX ADMIN — MORPHINE SULFATE PRN MG: 2 INJECTION, SOLUTION INTRAMUSCULAR; INTRAVENOUS at 00:19

## 2018-06-09 RX ADMIN — ESCITALOPRAM OXALATE SCH MG: 20 TABLET ORAL at 09:43

## 2018-06-09 NOTE — MB
cc:

Aidan García MD

****

 

 

DATE:

06/09/2018

 

REASON FOR CONSULTATION:

Right hip intertrochanteric fracture.

 

CONSULTING PHYSICIAN:

Dr. Cristian Juárez.

 

HISTORY OF PRESENT ILLNESS:

Dennis is an 89-year-old male who had a fall.  He did have some 

dizziness.  He fell and landed on his right side.  He had immediate 

right hip pain.  He had difficulty standing or ambulating.  He 

presented to the Emergency Room, where x-rays revealed a right hip 

intertrochanteric fracture.  He is currently awake and alert, on the 

orthopedic floor.  His only complaint is his right hip.  Pain is 

improved with rest and is worse with movement.  He denies any hip pain

prior to his fall.

 

PAST MEDICAL AND SURGICAL HISTORY:

Depression, atrial fibrillation, hypertension, arthritis.

 

FAMILY HISTORY:

Positive for cancer in his mother.

 

SOCIAL HISTORY:

The patient denies tobacco or drug use.  He drinks occasional wine.

 

REVIEW OF SYSTEMS:

The patient denies headache, visual changes, neck pain, chest pain, 

shortness of breath, abdominal pain, nausea, vomiting, recent weight 

loss, fevers or chills, or numbness or tingling of his extremities.  

He complains of right hip pain.  The pain is worse with movement.

 

LABORATORY DATA:

The patient has a white blood cell count of 7.2, hematocrit of 42.9, 

platelet count of 182.  INR 1.2.  potassium 4.4, BUN 15, creatinine 

0.90.

 

IMAGING STUDIES:

X-rays of the right hip were reviewed.  X-rays revealed a minimally 

displaced right hip intertrochanteric fracture.

 

PHYSICAL EXAMINATION:

GENERAL:  The patient is a well-developed, well-nourished 89-year-old 

male.  He is awake and alert.  He is in no acute distress.

VITAL SIGNS:  Temperature 98.2, pulse 65, respirations 18, blood 

pressure 124/67, O2 saturation 95% on room air.

HEENT:  Head:  The patient is normocephalic.  Pupils are equal.

NECK:  Soft and nontender.  The trachea is in the midline.

ABDOMEN:  Soft, nontender, and nondistended.

EXTREMITIES:  Examination of bilateral upper extremities reveals 

minimal pain with shoulder, elbow or wrist motion.  He has good 

capillary refill in his fingers.  Skin is intact.  Radial pulses are 

palpable.  Examination of left leg reveals no pain with hip, knee or 

ankle motion.  Skin is intact.  Dorsalis pedis pulse is palpable.  

Examination of the right leg reveals pain with any hip motion.  He has

no tenderness around his knee, tibia or ankle.  Skin is intact.  

Dorsalis pedis pulse is palpable.  Sensation is intact.

 

IMPRESSION:

1.  Atrial fibrillation.

2.  Dizziness with possible syncope.

3.  Right hip intertrochanteric fracture.

 

PLAN:

Treatment options were discussed with the patient.  At this point, I 

would recommend right hip reduction and intramedullary nail fixation. 

Risks of surgery include bleeding, infection; injuries to arteries, 

nerves and blood vessels; nonunion, malunion, painful hardware as well

as medical complications including blood clot, stroke, heart attack 

and death.  Cardiology consult and clearance is pending.  I will plan 

on surgery once this is completed.  All questions were answered.

 

A mid-level provider in my office, nurse practitioner or PA, may see 

this patient on a follow-up basis and continue to implement the 

objective of this plan including:  Starting or adjusting medications, 

injections of muscle, tendon, bursa or joints, cast application, 

orthotic or brace application, physical therapy, further radiographic 

studies including x-ray, MRI, CT, ultrasounds or bone scan, vascular 

studies, neurologic studies, or other specialist consultations, and 

proceeding with surgical management as appropriate.

 

 

__________________________________

MD BARBI Agarwal/KELLY

D: 06/09/2018, 07:15 AM

T: 06/09/2018, 07:54 AM

Visit #: [id number]

Job #: [id number]

## 2018-06-09 NOTE — HHI.PR
Subjective


Remarks


Patient to go to OR today for ORIF right femur fracture pending Cards clearance.





Objective





Vital Signs








  Date Time  Temp Pulse Resp B/P (MAP) Pulse Ox O2 Delivery O2 Flow Rate FiO2


 


6/9/18 08:00 99.1 73 20 132/64 (86) 96   


 


6/9/18 04:00 98.2 65 18 124/67 (86) 95   


 


6/9/18 00:00 98.3 67 18 111/57 (75) 95   


 


6/8/18 19:45 97.8 72 18 131/63 (85) 97   


 


6/8/18 18:17  77 13 139/76 (97) 96 Room Air  


 


6/8/18 15:05  69 20 158/82 (107) 95   














I/O      


 


 6/8/18 6/8/18 6/8/18 6/9/18 6/9/18 6/9/18





 07:00 15:00 23:00 07:00 15:00 23:00


 


Intake Total    400 ml  


 


Output Total    750 ml  


 


Balance    -350 ml  


 


      


 


Intake Oral    400 ml  


 


Output Urine Total    750 ml  


 


# Bowel Movements    0  








Result Diagram:  


6/8/18 1515                                                                    

            6/8/18 1515





Imaging





Last Impressions








Pelvis X-Ray 6/8/18 0000 Signed





Impressions: 





 CONCLUSION:





    Acute intertrochanteric right hip fracture.





  





 


 


Head CT 6/8/18 0000 Signed





Impressions: 





 CONCLUSION:





 1.  Negative CT Head non contrast.





  





 


 


Femur X-Ray 6/8/18 0000 Signed





Impressions: 





 CONCLUSION: 





 Intertrochanteric fracture.





  





 





  





 





  





 





  





 


 


Cervical Spine CT 6/8/18 0000 Signed





Impressions: 





 CONCLUSION:





 1.     No fracture or dislocation.





 2.  Pronounced diffuse degenerative changes.





  





 








Objective Remarks


GENERAL: 


SKIN: Warm and dry.


HEAD: Normocephalic.


EYES: No scleral icterus. No injection or drainage. 


NECK: Supple, trachea midline. No JVD or lymphadenopathy.


CARDIOVASCULAR: Regular rate and rhythm without murmurs, gallops, or rubs. 


RESPIRATORY: Breath sounds equal bilaterally. No accessory muscle use.


GASTROINTESTINAL: Abdomen soft, non-tender, nondistended. 


MUSCULOSKELETAL: No cyanosis, or edema. Right hip pain with splint in place.


BACK: Nontender without obvious deformity. No CVA tenderness.





Medications and IVs





Current Medications








 Medications


  (Trade)  Dose


 Ordered  Sig/Dee


 Route  Start Time


 Stop Time Status Last Admin


 


  (NS Flush)  2 ml  UNSCH  PRN


 IV FLUSH  6/8/18 15:15


     


 


 


  (Dulcolax Supp)  10 mg  DAILY


 RECTAL  6/9/18 09:00


     


 


 


  (Vitamin D3)  2,000 units  DAILY


 PO  6/9/18 09:00


    6/9/18 09:43


 


 


  (Vitamin B12)  2,000 mcg  DAILY


 PO  6/9/18 09:00


    6/9/18 09:00


 


 


  (Lexapro)  20 mg  DAILY


 PO  6/9/18 09:00


    6/9/18 09:43


 


 


  (Lasix)  20 mg  DAILY


 PO  6/9/18 09:00


    6/9/18 09:43


 


 


  (Xalatan 0.005%


 Opth Soln)  1 drop  HS


 EACH EYE  6/8/18 21:00


    6/8/18 22:58


 


 


  (Betagan


 Liquifilm 0.5%)  1 drop  BID


 EACH EYE  6/8/18 21:00


    6/9/18 09:42


 


 


  (Remeron)  15 mg  HS


 PO  6/8/18 21:00


    6/8/18 21:22


 


 


  (KCl)  10 meq  DAILY


 PO  6/9/18 09:00


    6/9/18 09:44


 


 


  ([place] Thyroid)  135 mg  DAILY


 PO  6/9/18 09:00


   Future hold  


 


 


 Patient Own


 Medication  PT OWN


 MED:


 DILTIA...  DAILY


 PO  6/9/18 09:00


   Future Hold  


 


 


  (Colace)  100 mg  DAILY


 PO  6/9/18 09:00


     


 


 


 Patient Own


 Medication  PT OWN MED:


 PRESERVISION


 AREDS 2...  DAILY


 PO  6/9/18 09:00


   Future Hold  


 


 


  (Morphine Inj)  2 mg  Q4H  PRN


 IV  6/8/18 21:30


    6/9/18 10:41


 


 


 Lactated Ringer's  1,000 ml @ 


 30 mls/hr  Q24H PRN


 IV  6/9/18 03:30


 6/12/18 03:29  6/9/18 06:16


 


 


 Sodium Chloride  500 ml @ 


 30 mls/hr  [id number] PRN


 IV  6/9/18 03:30


 6/12/18 03:29   


 


 


  (Betadine 5%


 Antisepsis Kit)  1 applic  ON CALL  PRN


 EACH NARE  6/9/18 03:30


 6/12/18 03:29   


 


 


  (Chlorhexidine


 2% Cloth)  3 pack  ON CALL  PRN


 TOPICAL  6/9/18 03:30


 6/12/18 03:29   


 











Assessment and Plan


Problem List:  


(1) Intertrochanteric fracture of right hip


ICD Codes:  S72.141A - Displaced intertrochanteric fracture of right femur, 

initial encounter for closed fracture


Status:  Acute


Plan:  F/U Ortho and Cards recommendations





Assessment and Plan


F/U Cards and Ortho recommendations S/P right femur fracture


Discussed Condition With


Patient an RN


Discharge Planning


Home





Problem Qualifiers





(1) Intertrochanteric fracture of right hip:  


Qualified Codes:  S72.144A - Nondisplaced intertrochanteric fracture of right 

femur, initial encounter for closed fracture








Nelson Foley Jun 9, 2018 11:45

## 2018-06-09 NOTE — EKG
Date Performed: 06/09/2018       Time Performed: 05:25:18

 

PTAGE:      89 years

 

EKG:      Atrial fibrillation Demand pacing Possible inferior infarct - age undetermined Anterolatera
l T wave changes are nonspecific Abnormal ECG 

 

NO PREVIOUS TRACING            

 

DOCTOR:   Humble Dozier  Interpretating Date/Time  06/09/2018 14:48:17

## 2018-06-10 VITALS
SYSTOLIC BLOOD PRESSURE: 110 MMHG | DIASTOLIC BLOOD PRESSURE: 59 MMHG | TEMPERATURE: 98.9 F | HEART RATE: 87 BPM | RESPIRATION RATE: 18 BRPM | OXYGEN SATURATION: 96 %

## 2018-06-10 VITALS
TEMPERATURE: 98.3 F | DIASTOLIC BLOOD PRESSURE: 59 MMHG | OXYGEN SATURATION: 95 % | SYSTOLIC BLOOD PRESSURE: 113 MMHG | HEART RATE: 68 BPM | RESPIRATION RATE: 18 BRPM

## 2018-06-10 VITALS
RESPIRATION RATE: 18 BRPM | DIASTOLIC BLOOD PRESSURE: 71 MMHG | SYSTOLIC BLOOD PRESSURE: 131 MMHG | HEART RATE: 83 BPM | OXYGEN SATURATION: 95 % | TEMPERATURE: 97.5 F

## 2018-06-10 VITALS
HEART RATE: 75 BPM | TEMPERATURE: 97.3 F | OXYGEN SATURATION: 96 % | RESPIRATION RATE: 18 BRPM | SYSTOLIC BLOOD PRESSURE: 141 MMHG | DIASTOLIC BLOOD PRESSURE: 63 MMHG

## 2018-06-10 VITALS
SYSTOLIC BLOOD PRESSURE: 125 MMHG | DIASTOLIC BLOOD PRESSURE: 61 MMHG | RESPIRATION RATE: 18 BRPM | OXYGEN SATURATION: 92 % | HEART RATE: 92 BPM | TEMPERATURE: 97.8 F

## 2018-06-10 VITALS
HEART RATE: 76 BPM | OXYGEN SATURATION: 98 % | RESPIRATION RATE: 18 BRPM | TEMPERATURE: 97.5 F | DIASTOLIC BLOOD PRESSURE: 55 MMHG | SYSTOLIC BLOOD PRESSURE: 127 MMHG

## 2018-06-10 PROCEDURE — 0QS636Z REPOSITION RIGHT UPPER FEMUR WITH INTRAMEDULLARY INTERNAL FIXATION DEVICE, PERCUTANEOUS APPROACH: ICD-10-PCS | Performed by: ORTHOPAEDIC SURGERY

## 2018-06-10 RX ADMIN — ESCITALOPRAM OXALATE SCH MG: 20 TABLET ORAL at 09:00

## 2018-06-10 RX ADMIN — CYANOCOBALAMIN TAB 1000 MCG SCH MCG: 1000 TAB at 10:55

## 2018-06-10 RX ADMIN — CALCIUM CARBONATE-CHOLECALCIFEROL TAB 250 MG-125 UNIT SCH MG: 250-125 TAB at 12:59

## 2018-06-10 RX ADMIN — DOCUSATE SODIUM SCH MG: 100 CAPSULE, LIQUID FILLED ORAL at 10:53

## 2018-06-10 RX ADMIN — POTASSIUM CHLORIDE SCH MEQ: 750 TABLET, FILM COATED, EXTENDED RELEASE ORAL at 10:56

## 2018-06-10 RX ADMIN — MORPHINE SULFATE PRN MG: 2 INJECTION, SOLUTION INTRAMUSCULAR; INTRAVENOUS at 12:59

## 2018-06-10 RX ADMIN — VITAMIN D, TAB 1000IU (100/BT) SCH UNITS: 25 TAB at 10:55

## 2018-06-10 RX ADMIN — THYROID, PORCINE SCH MG: 15 TABLET ORAL at 10:53

## 2018-06-10 RX ADMIN — BISACODYL SCH MG: 10 SUPPOSITORY RECTAL at 09:00

## 2018-06-10 RX ADMIN — MIRTAZAPINE SCH MG: 15 TABLET, FILM COATED ORAL at 21:32

## 2018-06-10 RX ADMIN — LATANOPROST SCH DROP: 50 SOLUTION OPHTHALMIC at 21:33

## 2018-06-10 RX ADMIN — LEVOBUNOLOL HYDROCHLORIDE SCH DROP: 5 SOLUTION/ DROPS OPHTHALMIC at 21:33

## 2018-06-10 RX ADMIN — CALCIUM CARBONATE-CHOLECALCIFEROL TAB 250 MG-125 UNIT SCH MG: 250-125 TAB at 10:54

## 2018-06-10 RX ADMIN — OXYTOCIN PRN MLS/HR: 10 INJECTION, SOLUTION INTRAMUSCULAR; INTRAVENOUS at 05:12

## 2018-06-10 RX ADMIN — MORPHINE SULFATE PRN MG: 2 INJECTION, SOLUTION INTRAMUSCULAR; INTRAVENOUS at 16:00

## 2018-06-10 RX ADMIN — LEVOBUNOLOL HYDROCHLORIDE SCH DROP: 5 SOLUTION/ DROPS OPHTHALMIC at 09:00

## 2018-06-10 RX ADMIN — FUROSEMIDE SCH MG: 20 TABLET ORAL at 10:57

## 2018-06-10 RX ADMIN — CALCIUM CARBONATE-CHOLECALCIFEROL TAB 250 MG-125 UNIT SCH MG: 250-125 TAB at 18:00

## 2018-06-10 NOTE — PD.CARD.PN
Subjective


Subjective Remarks


Post-surgery this morning





Doing well overall





No complaints





Objective


Medications





Current Medications








 Medications


  (Trade)  Dose


 Ordered  Sig/Dee


 Route  Start Time


 Stop Time Status Last Admin


 


  (NS Flush)  2 ml  UNSCH  PRN


 IV FLUSH  6/8/18 15:15


     


 


 


  (Dulcolax Supp)  10 mg  DAILY


 RECTAL  6/9/18 09:00


     


 


 


  (Vitamin D3)  2,000 units  DAILY


 PO  6/9/18 09:00


    6/10/18 10:55


 


 


  (Vitamin B12)  2,000 mcg  DAILY


 PO  6/9/18 09:00


    6/10/18 10:55


 


 


  (Lexapro)  20 mg  DAILY


 PO  6/9/18 09:00


    6/9/18 09:43


 


 


  (Lasix)  20 mg  DAILY


 PO  6/9/18 09:00


    6/10/18 10:57


 


 


  (Xalatan 0.005%


 Opth Soln)  1 drop  HS


 EACH EYE  6/8/18 21:00


    6/9/18 21:01


 


 


  (Betagan


 Liquifilm 0.5%)  1 drop  BID


 EACH EYE  6/8/18 21:00


    6/9/18 21:01


 


 


  (Remeron)  15 mg  HS


 PO  6/8/18 21:00


    6/9/18 21:00


 


 


  (KCl)  10 meq  DAILY


 PO  6/9/18 09:00


    6/10/18 10:56


 


 


  ([place] Thyroid)  135 mg  DAILY


 PO  6/9/18 09:00


   Future hold 6/10/18 10:53


 


 


 Patient Own


 Medication  PT OWN


 MED:


 DILTIA...  DAILY


 PO  6/9/18 09:00


   Future Hold  


 


 


  (Colace)  100 mg  DAILY


 PO  6/9/18 09:00


    6/10/18 10:53


 


 


 Patient Own


 Medication  PT OWN MED:


 PRESERVISION


 AREDS 2...  DAILY


 PO  6/9/18 09:00


   Future Hold  


 


 


  (Morphine Inj)  2 mg  Q4H  PRN


 IV  6/8/18 21:30


    6/9/18 21:01


 


 


 Lactated Ringer's  1,000 ml @ 


 30 mls/hr  Q24H PRN


 IV  6/9/18 03:30


 6/12/18 03:29  6/10/18 05:12


 


 


 Sodium Chloride  500 ml @ 


 30 mls/hr  [id number] PRN


 IV  6/9/18 03:30


 6/12/18 03:29   


 


 


  (Betadine 5%


 Antisepsis Kit)  1 applic  ON CALL  PRN


 EACH NARE  6/9/18 03:30


 6/12/18 03:29   


 


 


  (Chlorhexidine


 2% Cloth)  3 pack  ON CALL  PRN


 TOPICAL  6/9/18 03:30


 6/12/18 03:29   


 


 


  (Lovenox Inj)  30 mg  Q24H


 SQ  6/11/18 08:00


     


 


 


 Cefazolin Sodium


 1000 mg/Sodium


 Chloride  100 ml @ 


 200 mls/hr  Q8H


 IV  6/10/18 12:00


 6/11/18 04:29  6/10/18 10:57


 


 


  (Oscal-D 250-125)  250 mg  TID


 PO  6/10/18 09:00


    6/10/18 10:54


 


 


  (Benadryl)  25 mg  Q6H  PRN


 PO  6/10/18 08:30


     


 


 


  (Misc Nursing


 Information)  ALL


 NURSING


 DEPARTME...  UNSCH  PRN


 .XX  6/10/18 08:56


 6/11/18 08:55   


 








Vital Signs / I&O





Vital Signs








  Date Time  Temp Pulse Resp B/P (MAP) Pulse Ox O2 Delivery O2 Flow Rate FiO2


 


6/10/18 10:03 97.5 83 18 131/71 (91) 95   


 


6/10/18 09:37 98.0 78 18 144/69 (94) 97 Nasal Cannula 2 


 


6/10/18 09:15  79 18 120/68 (85) 97 Nasal Cannula 3 


 


6/10/18 09:00  81 17 149/72 (97) 97 Nasal Cannula 3 


 


6/10/18 08:49 97.6 85 17 159/75 (103) 95 Nasal Cannula 3 


 


6/10/18 04:20 97.3 75 18 141/63 (89) 96   


 


6/10/18 00:12 98.3 68 18 113/59 (77) 95   


 


6/9/18 16:50 98.5 63 18 137/78 (97) 95   


 


6/9/18 16:00 98.5 72 18 119/63 (81) 96   


 


6/9/18 12:55   18     














I/O      


 


 6/9/18 6/9/18 6/9/18 6/10/18 6/10/18 6/10/18





 07:00 15:00 23:00 07:00 15:00 23:00


 


Intake Total 400 ml  480 ml 320 ml 600 ml 


 


Output Total 750 ml  600 ml 1200 ml 300 ml 


 


Balance -350 ml  -120 ml -880 ml 300 ml 


 


      


 


Intake Oral 400 ml  480 ml 320 ml  


 


IV Total     600 ml 


 


Output Urine Total 750 ml  600 ml 1200 ml 200 ml 


 


Estimated Blood Loss     100 ml 


 


# Bowel Movements 0   0  








Physical Exam


GENERAL: NAD


SKIN: Warm and dry.


HEAD: Atraumatic. Normocephalic. 


EYES: Pupils equal and round. No scleral icterus. No injection or drainage. 


ENT: No nasal bleeding or discharge.  Mucous membranes pink and moist.


NECK: Trachea midline. No JVD. 


CARDIOVASCULAR: Irregularly irregular


RESPIRATORY: No accessory muscle use. Clear to auscultation. Breath sounds 

equal bilaterally. 


GASTROINTESTINAL: Abdomen soft, non-tender, nondistended. Hepatic and splenic 

margins not palpable. 


MUSCULOSKELETAL: Extremities without clubbing, cyanosis, or edema.


NEUROLOGICAL: Awake and alert. No obvious cranial nerve deficits.  Motor 

grossly within normal limits. Five out of 5 muscle strength in the arms and 

legs.  Normal speech.


PSYCHIATRIC: Appropriate mood and affect; insight and judgment normal.


Imaging





Last 24 hours Impressions








Hip X-Ray 6/10/18 0000 Signed





Impressions: 





 CONCLUSION: 





 Postsurgical changes.





  





 





  





  





 





  





 





  





 











Assessment and Plan


Problem List:  


(1) Intertrochanteric fracture of right hip


ICD Codes:  S72.141A - Displaced intertrochanteric fracture of right femur, 

initial encounter for closed fracture


Status:  Acute


(2) Afib


ICD Codes:  I48.91 - Unspecified atrial fibrillation


Status:  Acute


(3) Anticoagulated


ICD Codes:  Z79.01 - Long term (current) use of anticoagulants


Status:  Acute


(4) History of pacemaker


ICD Codes:  Z95.0 - Presence of cardiac pacemaker


Status:  Acute


Assessment and Plan


1) Mechanical fall leading to right hip fracture s/p ORIF


2) Afib


   Controlled rate


   Restart Xarelto when possible per Ortho


3) No further cardiovascular issues


   Follow up with Dr. Waterman as previously scheduled in the office


   Call with questions





Problem Qualifiers





(1) Intertrochanteric fracture of right hip:  


Qualified Codes:  S72.144A - Nondisplaced intertrochanteric fracture of right 

femur, initial encounter for closed fracture








Neil Stark DO [place] 10, 2018 12:20

## 2018-06-10 NOTE — HHI.PR
Subjective


Remarks


Patient seen briefly enroute to OR for ORIF hip fracture.





Objective





Vital Signs








  Date Time  Temp Pulse Resp B/P (MAP) Pulse Ox O2 Delivery O2 Flow Rate FiO2


 


6/10/18 12:00 97.5 76 18 127/55 (79) 98   


 


6/10/18 10:03 97.5 83 18 131/71 (91) 95   


 


6/10/18 09:37 98.0 78 18 144/69 (94) 97 Nasal Cannula 2 


 


6/10/18 09:15  79 18 120/68 (85) 97 Nasal Cannula 3 


 


6/10/18 09:00  81 17 149/72 (97) 97 Nasal Cannula 3 


 


6/10/18 08:49 97.6 85 17 159/75 (103) 95 Nasal Cannula 3 


 


6/10/18 04:20 97.3 75 18 141/63 (89) 96   


 


6/10/18 00:12 98.3 68 18 113/59 (77) 95   


 


6/9/18 16:50 98.5 63 18 137/78 (97) 95   


 


6/9/18 16:00 98.5 72 18 119/63 (81) 96   














I/O      


 


 6/9/18 6/9/18 6/9/18 6/10/18 6/10/18 6/10/18





 07:00 15:00 23:00 07:00 15:00 23:00


 


Intake Total 400 ml  480 ml 320 ml 600 ml 


 


Output Total 750 ml  600 ml 1200 ml 300 ml 


 


Balance -350 ml  -120 ml -880 ml 300 ml 


 


      


 


Intake Oral 400 ml  480 ml 320 ml  


 


IV Total     600 ml 


 


Output Urine Total 750 ml  600 ml 1200 ml 200 ml 


 


Estimated Blood Loss     100 ml 


 


# Bowel Movements 0   0  








Result Diagram:  


6/8/18 1515                                                                    

            6/8/18 1515





Imaging





Last Impressions








Hip X-Ray 6/10/18 0000 Signed





Impressions: 





 CONCLUSION: 





 Postsurgical changes.





  





 





  





  





 





  





 





  





 


 


Pelvis X-Ray 6/8/18 0000 Signed





Impressions: 





 CONCLUSION:





    Acute intertrochanteric right hip fracture.





  





 


 


Head CT 6/8/18 0000 Signed





Impressions: 





 CONCLUSION:





 1.  Negative CT Head non contrast.





  





 


 


Femur X-Ray 6/8/18 0000 Signed





Impressions: 





 CONCLUSION: 





 Intertrochanteric fracture.





  





 





  





 





  





 





  





 


 


Cervical Spine CT 6/8/18 0000 Signed





Impressions: 





 CONCLUSION:





 1.     No fracture or dislocation.





 2.  Pronounced diffuse degenerative changes.





  





 








Procedures


ORIF right hip fracture


Objective Remarks


GENERAL: Awake and alert enroute to OR


SKIN: Warm and dry.


HEAD: Normocephalic.


EYES: No scleral icterus. No injection or drainage. 


NECK: Supple, trachea midline. No JVD or lymphadenopathy.


CARDIOVASCULAR: Regular rate and rhythm without murmurs, gallops, or rubs. 


RESPIRATORY: Breath sounds equal bilaterally. No accessory muscle use.


GASTROINTESTINAL: Abdomen soft, non-tender, nondistended. 


MUSCULOSKELETAL: No cyanosis, or edema. Right hip pain with splint in place.


BACK: Nontender without obvious deformity. No CVA tenderness.





Medications and IVs





Current Medications








 Medications


  (Trade)  Dose


 Ordered  Sig/Dee


 Route  Start Time


 Stop Time Status Last Admin


 


  (NS Flush)  2 ml  UNSCH  PRN


 IV FLUSH  6/8/18 15:15


     


 


 


  (Dulcolax Supp)  10 mg  DAILY


 RECTAL  6/9/18 09:00


     


 


 


  (Vitamin D3)  2,000 units  DAILY


 PO  6/9/18 09:00


    6/10/18 10:55


 


 


  (Vitamin B12)  2,000 mcg  DAILY


 PO  6/9/18 09:00


    6/10/18 10:55


 


 


  (Lexapro)  20 mg  DAILY


 PO  6/9/18 09:00


    6/9/18 09:43


 


 


  (Lasix)  20 mg  DAILY


 PO  6/9/18 09:00


    6/10/18 10:57


 


 


  (Xalatan 0.005%


 Opth Soln)  1 drop  HS


 EACH EYE  6/8/18 21:00


    6/9/18 21:01


 


 


  (Betagan


 Liquifilm 0.5%)  1 drop  BID


 EACH EYE  6/8/18 21:00


    6/9/18 21:01


 


 


  (Remeron)  15 mg  HS


 PO  6/8/18 21:00


    6/9/18 21:00


 


 


  (KCl)  10 meq  DAILY


 PO  6/9/18 09:00


    6/10/18 10:56


 


 


  ([place] Thyroid)  135 mg  DAILY


 PO  6/9/18 09:00


   Future hold 6/10/18 10:53


 


 


 Patient Own


 Medication  PT OWN


 MED:


 DILTIA...  DAILY


 PO  6/9/18 09:00


   Future Hold  


 


 


  (Colace)  100 mg  DAILY


 PO  6/9/18 09:00


    6/10/18 10:53


 


 


 Patient Own


 Medication  PT OWN MED:


 PRESERVISION


 AREDS 2...  DAILY


 PO  6/9/18 09:00


   Future Hold  


 


 


  (Morphine Inj)  2 mg  Q4H  PRN


 IV  6/8/18 21:30


    6/10/18 12:59


 


 


 Lactated Ringer's  1,000 ml @ 


 30 mls/hr  Q24H PRN


 IV  6/9/18 03:30


 6/12/18 03:29  6/10/18 05:12


 


 


 Sodium Chloride  500 ml @ 


 30 mls/hr  [id number] PRN


 IV  6/9/18 03:30


 6/12/18 03:29   


 


 


  (Betadine 5%


 Antisepsis Kit)  1 applic  ON CALL  PRN


 EACH NARE  6/9/18 03:30


 6/12/18 03:29   


 


 


  (Chlorhexidine


 2% Cloth)  3 pack  ON CALL  PRN


 TOPICAL  6/9/18 03:30


 6/12/18 03:29   


 


 


  (Lovenox Inj)  30 mg  Q24H


 SQ  6/11/18 08:00


     


 


 


 Cefazolin Sodium


 1000 mg/Sodium


 Chloride  100 ml @ 


 200 mls/hr  Q8H


 IV  6/10/18 12:00


 6/11/18 04:29  6/10/18 10:57


 


 


  (Oscal-D 250-125)  250 mg  TID


 PO  6/10/18 09:00


    6/10/18 12:59


 


 


  (Benadryl)  25 mg  Q6H  PRN


 PO  6/10/18 08:30


     


 


 


  (Misc Nursing


 Information)  ALL


 NURSING


 DEPARTME...  UNSCH  PRN


 .XX  6/10/18 08:56


 6/11/18 08:55   


 











Assessment and Plan


Problem List:  


(1) Intertrochanteric fracture of right hip


ICD Codes:  S72.141A - Displaced intertrochanteric fracture of right femur, 

initial encounter for closed fracture


Status:  Acute


Plan:  F/U Ortho and Cards recommendations





Assessment and Plan


F/U Cards and Ortho recommendations S/P right femur fracture


Discussed Condition With


patient


Discharge Planning


home





Problem Qualifiers





(1) Intertrochanteric fracture of right hip:  


Qualified Codes:  S72.144A - Nondisplaced intertrochanteric fracture of right 

femur, initial encounter for closed fracture








Nelson Foley Avery 10, 2018 13:39

## 2018-06-10 NOTE — RADRPT
EXAM DATE:  6/10/2018 9:57 AM EDT

AGE/SEX:        89 years / Male



INDICATIONS:  ORIF Intertrochanteric nail placement.



CLINICAL DATA:  This is the patient's initial encounter. Patient reports that signs and symptoms have
 been present for 1 day and indicates a pain score of Nonresponsive. 

                                                                          

MEDICAL/SURGICAL HISTORY:       None. None.



COMPARISON:      No prior exams available for comparison. 





FINDINGS:  

4 spot intraoperative fluoroscopic views of the right hip demonstrate intertrochanteric nail and intr
amedullary taisha fixation hardware placement across the right proximal femur.



CONCLUSION: 

Postsurgical changes.



 







Electronically signed by: Cy Garcia MD  6/10/2018 9:57 AM EDT

## 2018-06-10 NOTE — PD.OP
__________________________________________________





Operative Report


Date of Surgery:  Avery 10, 2018


Preoperative Diagnosis:  


Right hip intertrochanteric fracture


Postoperative Diagnosis:  


Procedure:


Right hip reduction and intramedullary fixation


Anesthesia:


General


Surgeon:


Aidan Raman


Assistant(s):


Mac Hendricks PA-C


 


The surgical procedure was assisted by my physician assistant. My P.A. presence 

was necessary throughout this case for the manipulation and positioning of the 

surgical extremity. My P.A. was assisting me throughout the duration of this 

procedure. The skill set of a physician assistant was medically necessary to 

complete this procedure. During the surgical case the surgical tech was working 

at the back table and the physician assistant was directly assisting me.


Operation and Findings:


implants used: [Biomet 13]mm  short troch nail





Plan of activity: Weight-bear as tolerated





Patient was seen and evaluated preoperatively.  The patient has significant hip 

pain from intertrochanteric hip fracture.  The risk and benefits of surgery 

were discussed in depth with the patient to include bleeding infection nonunion 

malunion and need for hip replacement painful hardware as well as medical 

competitions including but not stroke heart attack and death.  Informed consent 

was obtained.  Operative site was marked.  Patient was brought to the operating 

room and placed on fracture table.  IV sedation was administered by 

anesthesiologist.  Timeout procedure was performed.  Hip and leg were prepped 

with alcohol followed by DuraPrep and draped in the usual sterile fashion.  IV 

antibiotics were given prior to incision.





Procedure began with reduction of fracture.  Traction was applied.  The leg was 

manipulated to achieve reduction.  Excellent reduction was achieved.  

Fluoroscopy was used to confirm reduction.  A three inch incision was made 

proximal to the trochanter.  Subcutaneous tissue was dissected bluntly.  

Guidepin was placed at the tip of the trochanter and advanced into the femoral 

canal.  Fluoroscopy confirmed appropriate guidepin placement.  A opening reamer 

was placed over the guidepin.  The  nail was attached to the insertion handle.  

Nail was now placed through the tip of the trochanter into the femoral canal.  

Fluoroscopy confirmed appropriate nail placement.  A second incision was made 

over the lateral thigh.  Cannulas were placed through the insertion handle down 

to the femur.  Guidepin was now placed through the femoral nail into the center 

of the femoral head.  Fluoroscopy confirmed appropriate guidepin placement.  

Screw length was measured.  Cannulated drill was placed over the guidepin.  

Appropriate length lag screw was now placed.  Traction was released and 

compression was applied. The set screw was now tightened in dynamic mode.  

Using the insertion handle as a guide a distal interlocking screw was drilled 

and placed.  Final fluoroscopy revealed well aligned fracture with well-placed 

hardware.  Incision was closed with 3-0 Vicryl and staples.  Sterile dressings 

were applied.  Patient was awakened and transferred to recovery room.











iAdan Raman MD Avery 10, 2018 08:27

## 2018-06-10 NOTE — MB
cc:

Neil Stark Vincent G DO

****

 

 

DATE:

06/09/2018

 

REASON FOR CONSULTATION:

Preoperative cardiovascular evaluation.

 

HISTORY OF PRESENT ILLNESS:

Jack Weiler is a pleasant 89-year-old male who sees my partner, Dr. Maggie Waterman, in the office and presented after a mechanical fall.  He

was leaving [facility] on 06/08/2018 after lunch when he misstepped on the

sidewalk causing him to fall landing on his right hip.  Pain continued

to worsen and so he presented to the Emergency Room.  On arrival, he 

was found to have a right intertrochanteric fracture and was seen by 

Dr. García for consideration of surgery.  I was asked to see him for 

preoperative cardiovascular evaluation to help in his management.

 

In seeing him, he is currently hemodynamically stable without chest 

pain or shortness of breath.  He does have a history of atrial 

fibrillation.  He is currently electrically stable.  He is on Xarelto 

for his atrial fibrillation.

 

PAST MEDICAL HISTORY:

1.  Depression.

2.  Atrial fibrillation.

3.  Hypertension.

4.  Arthritis.

 

PAST SURGICAL HISTORY:

1.  Permanent pacemaker placement.

2.  Appendectomy.

3.  Prostatectomy.

4.  Bilateral eye lens implants.

5.  ORIF right scapula (02/25/2016).

6.  Hernia repair x 4.

 

ALLERGIES:

1.  AZITHROMYCIN.

2.  FLUOXETINE.

3.  LEVOBUNOLOL.

4.  TRAMADOL.

 

MEDICATIONS:

1.  Xarelto 20 mg daily.

2.  Cardizem 60 mg daily.

3.  Lexapro 20 mg daily.

4.  Remeron 15 mg every night.

5.  Potassium 10 mEq daily.

6.  Lasix 40 mg in the morning and 20 mg in the afternoon.

7.  Levobunolol 1 drop each eye b.i.d.

8.  Latanoprost 1 drop each eye every night.

9.  [place] Thyroid ____ mg daily.

 

FAMILY HISTORY:

Denies premature coronary artery disease or sudden cardiac death 

within the family.

 

SOCIAL HISTORY:

He drinks 1 glass a day of wine.  Smoked socially in college, but quit

after that.  Denies drug abuse.

 

REVIEW OF SYSTEMS:

Fourteen systems were reviewed including osteopathic pertinent 

positives and negatives as above, otherwise negative.

 

PHYSICAL EXAMINATION:

VITAL SIGNS:  Temperature 98.5, heart rate 72, blood pressure 119/63, 

respirations 18, pulse oximetry 96% on room air.

GENERAL:  The patient appears well in no acute distress, alert, awake 

and oriented x3.

HEENT:  Extraocular muscles intact.  Mucous membranes moist.

NECK:  Supple.  No JVD at 45 degrees.  No carotid bruits heard 

bilaterally.

HEART:  Rate is irregularly irregular.  Positive first and second 

heart sounds with no noted murmurs, gallops or rubs.

LUNGS:  Clear to auscultation bilaterally.  No wheezes, rales or 

rhonchi.

ABDOMEN:  Soft, nontender, nondistended.  No organomegaly noted.

EXTREMITIES:  Show no clubbing, cyanosis or edema.  Right lower 

extremity is somewhat turned out and shorter than the left.

NEUROLOGIC:  No focal deficits.

SKIN:  Warm, dry and intact.

OSTEOPATHIC:  No kyphoscoliosis, lordosis or paraspinal tender points.

 

Electrocardiogram (06/09/2018 at 0525):  Atrial fibrillation with 

controlled ventricular response, demand pacing, inferior infarct age 

undetermined, no change from previous (03/08/2016).

 

LABORATORY DATA:

Hemoglobin 14.6, hematocrit 42.9, platelets 182.  Potassium 4.4, BUN 

15, creatinine 0.93.

 

IMPRESSION:

1.  Preoperative cardiovascular exam.

2.  Mechanical fall.

3.  Right intertrochanteric fracture.

4.  Atrial fibrillation.

5.  History of hypertension.

 

RECOMMENDATIONS:

1.  Mr. Weiler presented after a mechanical fall with a right hip 

fracture and was evaluated by Dr. García for possible surgery.

2.  I was asked to see him preoperatively for cardiovascular risk 

assessment.  Overall, I believe that he is an intermediate risk and 

should proceed as the known morbidity and mortality of not undergoing 

surgery for this is extensive.  Overall, he has no chest pain, is not 

in heart failure, and is electrically and hemodynamically stable and 

may proceed.

3.  I discussed this with Dr. García over the phone.

4.  We will continue to follow him with you to help out 

postoperatively with his management.

5.  Xarelto has been held with his last dose being on 06/07.

 

Thank you for allowing me to see Jack Weiler. If there are any 

questions, please do not hesitate to call.

 

 

__________________________________

DO KAR Dye//mary ann

D: 06/09/2018, 09:47 PM

T: 06/09/2018, 11:24 PM

Visit #: [id number]

Job #: [id number]

## 2018-06-11 VITALS
SYSTOLIC BLOOD PRESSURE: 100 MMHG | DIASTOLIC BLOOD PRESSURE: 64 MMHG | TEMPERATURE: 98.1 F | OXYGEN SATURATION: 96 % | RESPIRATION RATE: 18 BRPM | HEART RATE: 85 BPM

## 2018-06-11 VITALS
OXYGEN SATURATION: 95 % | HEART RATE: 97 BPM | DIASTOLIC BLOOD PRESSURE: 71 MMHG | RESPIRATION RATE: 19 BRPM | SYSTOLIC BLOOD PRESSURE: 166 MMHG | TEMPERATURE: 97.3 F

## 2018-06-11 VITALS
DIASTOLIC BLOOD PRESSURE: 67 MMHG | SYSTOLIC BLOOD PRESSURE: 131 MMHG | RESPIRATION RATE: 18 BRPM | OXYGEN SATURATION: 100 % | TEMPERATURE: 98.4 F | HEART RATE: 91 BPM

## 2018-06-11 VITALS
HEART RATE: 78 BPM | TEMPERATURE: 98 F | DIASTOLIC BLOOD PRESSURE: 57 MMHG | OXYGEN SATURATION: 97 % | RESPIRATION RATE: 17 BRPM | SYSTOLIC BLOOD PRESSURE: 119 MMHG

## 2018-06-11 VITALS
RESPIRATION RATE: 18 BRPM | OXYGEN SATURATION: 97 % | DIASTOLIC BLOOD PRESSURE: 64 MMHG | SYSTOLIC BLOOD PRESSURE: 111 MMHG | HEART RATE: 98 BPM | TEMPERATURE: 97.9 F

## 2018-06-11 LAB
HCT VFR BLD CALC: 38.1 % (ref 39–51)
HGB BLD-MCNC: 12.8 GM/DL (ref 13–17)

## 2018-06-11 RX ADMIN — CYANOCOBALAMIN TAB 1000 MCG SCH MCG: 1000 TAB at 09:25

## 2018-06-11 RX ADMIN — POTASSIUM CHLORIDE SCH MEQ: 750 TABLET, FILM COATED, EXTENDED RELEASE ORAL at 09:27

## 2018-06-11 RX ADMIN — CALCIUM CARBONATE-CHOLECALCIFEROL TAB 250 MG-125 UNIT SCH MG: 250-125 TAB at 09:26

## 2018-06-11 RX ADMIN — LATANOPROST SCH DROP: 50 SOLUTION OPHTHALMIC at 21:27

## 2018-06-11 RX ADMIN — ENOXAPARIN SODIUM SCH MG: 30 INJECTION SUBCUTANEOUS at 09:27

## 2018-06-11 RX ADMIN — LEVOBUNOLOL HYDROCHLORIDE SCH DROP: 5 SOLUTION/ DROPS OPHTHALMIC at 21:27

## 2018-06-11 RX ADMIN — CALCIUM CARBONATE-CHOLECALCIFEROL TAB 250 MG-125 UNIT SCH MG: 250-125 TAB at 17:47

## 2018-06-11 RX ADMIN — MIRTAZAPINE SCH MG: 15 TABLET, FILM COATED ORAL at 21:27

## 2018-06-11 RX ADMIN — FUROSEMIDE SCH MG: 20 TABLET ORAL at 09:26

## 2018-06-11 RX ADMIN — Medication PRN ML: at 04:48

## 2018-06-11 RX ADMIN — ESCITALOPRAM OXALATE SCH MG: 20 TABLET ORAL at 09:25

## 2018-06-11 RX ADMIN — CALCIUM CARBONATE-CHOLECALCIFEROL TAB 250 MG-125 UNIT SCH MG: 250-125 TAB at 13:00

## 2018-06-11 RX ADMIN — DOCUSATE SODIUM SCH MG: 100 CAPSULE, LIQUID FILLED ORAL at 09:25

## 2018-06-11 RX ADMIN — VITAMIN D, TAB 1000IU (100/BT) SCH UNITS: 25 TAB at 09:25

## 2018-06-11 RX ADMIN — LEVOBUNOLOL HYDROCHLORIDE SCH DROP: 5 SOLUTION/ DROPS OPHTHALMIC at 09:00

## 2018-06-11 RX ADMIN — BISACODYL SCH MG: 10 SUPPOSITORY RECTAL at 09:27

## 2018-06-11 RX ADMIN — Medication PRN ML: at 04:06

## 2018-06-11 RX ADMIN — THYROID, PORCINE SCH MG: 15 TABLET ORAL at 09:25

## 2018-06-11 NOTE — HHI.PR
Subjective


Remarks


Up in chair with spouse present.


Pleasantly confused, pain controlled currently





Objective





Vital Signs








  Date Time  Temp Pulse Resp B/P (MAP) Pulse Ox O2 Delivery O2 Flow Rate FiO2


 


6/11/18 08:00 98.0 78 17 119/57 (77) 97   


 


6/11/18 04:00 98.1 85 18 100/64 (76) 96   


 


6/11/18 00:00 97.9 98 18 111/64 (80) 97   


 


6/10/18 19:30 98.9 87 18 110/59 (76) 96   


 


6/10/18 16:00 97.8 92 18 125/61 (82) 92   


 


6/10/18 12:00 97.5 76 18 127/55 (79) 98   














I/O      


 


 6/10/18 6/10/18 6/10/18 6/11/18 6/11/18 6/11/18





 07:00 15:00 23:00 07:00 15:00 23:00


 


Intake Total 320 ml 600 ml  120 ml  


 


Output Total 1200 ml 1100 ml  450 ml  


 


Balance -880 ml -500 ml  -330 ml  


 


      


 


Intake Oral 320 ml   120 ml  


 


IV Total  600 ml    


 


Output Urine Total 1200 ml 1000 ml  450 ml  


 


Estimated Blood Loss  100 ml    


 


# Bowel Movements 0   1  








Result Diagram:  


6/8/18 1515                                                                    

            6/8/18 1515





Imaging





Last 72 hours Impressions








Hip X-Ray 6/10/18 0000 Signed





Impressions: 





 CONCLUSION: 





 Postsurgical changes.





  





 





  





  





 





  





 





  





 








Procedures


ORIF right hip fracture


Objective Remarks


Objective Remarks


GENERAL: Awake, alert, pleasantly confused


SKIN: Warm and dry.


HEAD: Normocephalic.


EYES: No scleral icterus. No injection or drainage. 


NECK: Supple, trachea midline. No JVD or lymphadenopathy.


CARDIOVASCULAR: Regular rate and rhythm without murmurs, gallops, or rubs. 


RESPIRATORY: Breath sounds equal bilaterally, diminished at base, O2 2L


GASTROINTESTINAL: Abdomen soft,distended, + BS


MUSCULOSKELETAL: No cyanosis, or edema.


Medications and IVs





Current Medications








 Medications


  (Trade)  Dose


 Ordered  Sig/Dee


 Route  Start Time


 Stop Time Status Last Admin


 


  (NS Flush)  2 ml  UNSCH  PRN


 IV FLUSH  6/8/18 15:15


    6/11/18 04:48


 


 


  (Dulcolax Supp)  10 mg  DAILY


 RECTAL  6/9/18 09:00


    6/11/18 09:27


 


 


  (Vitamin D3)  2,000 units  DAILY


 PO  6/9/18 09:00


    6/11/18 09:25


 


 


  (Vitamin B12)  2,000 mcg  DAILY


 PO  6/9/18 09:00


    6/11/18 09:25


 


 


  (Lexapro)  20 mg  DAILY


 PO  6/9/18 09:00


    6/11/18 09:25


 


 


  (Lasix)  20 mg  DAILY


 PO  6/9/18 09:00


    6/11/18 09:26


 


 


  (Xalatan 0.005%


 Opth Soln)  1 drop  HS


 EACH EYE  6/8/18 21:00


    6/10/18 21:33


 


 


  (Betagan


 Liquifilm 0.5%)  1 drop  BID


 EACH EYE  6/8/18 21:00


    6/11/18 09:00


 


 


  (Remeron)  15 mg  HS


 PO  6/8/18 21:00


    6/10/18 21:32


 


 


  (KCl)  10 meq  DAILY


 PO  6/9/18 09:00


    6/11/18 09:27


 


 


  ([place] Thyroid)  135 mg  DAILY


 PO  6/9/18 09:00


   Future hold 6/11/18 09:25


 


 


 Patient Own


 Medication  PT OWN


 MED:


 DILTIA...  DAILY


 PO  6/9/18 09:00


   Future Hold  


 


 


  (Colace)  100 mg  DAILY


 PO  6/9/18 09:00


    6/11/18 09:25


 


 


 Patient Own


 Medication  PT OWN MED:


 PRESERVISION


 AREDS 2...  DAILY


 PO  6/9/18 09:00


   Future Hold  


 


 


  (Morphine Inj)  2 mg  Q4H  PRN


 IV  6/8/18 21:30


    6/10/18 16:00


 


 


 Lactated Ringer's  1,000 ml @ 


 30 mls/hr  Q24H PRN


 IV  6/9/18 03:30


 6/12/18 03:29  6/10/18 05:12


 


 


 Sodium Chloride  500 ml @ 


 30 mls/hr  [id number] PRN


 IV  6/9/18 03:30


 6/12/18 03:29   


 


 


  (Betadine 5%


 Antisepsis Kit)  1 applic  ON CALL  PRN


 EACH NARE  6/9/18 03:30


 6/12/18 03:29   


 


 


  (Chlorhexidine


 2% Cloth)  3 pack  ON CALL  PRN


 TOPICAL  6/9/18 03:30


 6/12/18 03:29   


 


 


  (Lovenox Inj)  30 mg  Q24H


 SQ  6/11/18 08:00


    6/11/18 09:27


 


 


  (Oscal-D 250-125)  250 mg  TID


 PO  6/10/18 09:00


    6/11/18 09:26


 


 


  (Benadryl)  25 mg  Q6H  PRN


 PO  6/10/18 08:30


     


 


 


  (Ativan)  0.5 mg  Q8H  PRN


 PO  6/10/18 16:45


     


 











Assessment and Plan


Problem List:  


(1) Intertrochanteric fracture of right hip


ICD Codes:  S72.141A - Displaced intertrochanteric fracture of right femur, 

initial encounter for closed fracture


Status:  Acute


(2) Afib


ICD Codes:  I48.91 - Unspecified atrial fibrillation


Status:  Acute


Plan:  Rate controlled, Lovenox





(3) Hypothyroid


ICD Codes:  E03.9 - Hypothyroidism, unspecified


Plan:  Cont home medication, 





(4) Depression


ICD Codes:  F32.9 - Major depressive disorder, single episode, unspecified


Status:  Acute


Plan:  Cont Lexapro





Assessment and Plan


6/11/18- Up in chair this am with spouse at bedside. He is pleasantly confused. 

Spouse voices it related to MS. He is on o2 2liters, pain currently controlled.


He voices he does not want rehab he is going home, spouse voices he will need 

rehab. Will refer to [facility]. HX of Afib, rate controlled on Lovenox. Labs in am 


Labs in am





Problem Qualifiers





(1) Intertrochanteric fracture of right hip:  


Qualified Codes:  S72.144A - Nondisplaced intertrochanteric fracture of right 

femur, initial encounter for closed fracture








Ayesha Frye Jun 11, 2018 11:07

## 2018-06-12 VITALS
SYSTOLIC BLOOD PRESSURE: 117 MMHG | TEMPERATURE: 98 F | HEART RATE: 97 BPM | DIASTOLIC BLOOD PRESSURE: 60 MMHG | RESPIRATION RATE: 18 BRPM | OXYGEN SATURATION: 93 %

## 2018-06-12 VITALS
HEART RATE: 98 BPM | SYSTOLIC BLOOD PRESSURE: 123 MMHG | RESPIRATION RATE: 18 BRPM | TEMPERATURE: 97.5 F | DIASTOLIC BLOOD PRESSURE: 60 MMHG | OXYGEN SATURATION: 95 %

## 2018-06-12 VITALS
RESPIRATION RATE: 18 BRPM | SYSTOLIC BLOOD PRESSURE: 117 MMHG | HEART RATE: 99 BPM | DIASTOLIC BLOOD PRESSURE: 58 MMHG | TEMPERATURE: 97.9 F | OXYGEN SATURATION: 92 %

## 2018-06-12 VITALS
HEART RATE: 105 BPM | TEMPERATURE: 97.4 F | SYSTOLIC BLOOD PRESSURE: 134 MMHG | RESPIRATION RATE: 19 BRPM | OXYGEN SATURATION: 96 % | DIASTOLIC BLOOD PRESSURE: 75 MMHG

## 2018-06-12 VITALS
SYSTOLIC BLOOD PRESSURE: 134 MMHG | HEART RATE: 101 BPM | RESPIRATION RATE: 18 BRPM | DIASTOLIC BLOOD PRESSURE: 70 MMHG | OXYGEN SATURATION: 94 % | TEMPERATURE: 97.5 F

## 2018-06-12 LAB
BASOPHILS # BLD AUTO: 0 TH/MM3 (ref 0–0.2)
BASOPHILS NFR BLD: 0.1 % (ref 0–2)
BUN SERPL-MCNC: 17 MG/DL (ref 7–18)
CALCIUM SERPL-MCNC: 8.4 MG/DL (ref 8.5–10.1)
CHLORIDE SERPL-SCNC: 103 MEQ/L (ref 98–107)
COLOR UR: (no result)
CREAT SERPL-MCNC: 0.78 MG/DL (ref 0.6–1.3)
EOSINOPHIL # BLD: 0 TH/MM3 (ref 0–0.4)
EOSINOPHIL NFR BLD: 0.1 % (ref 0–4)
ERYTHROCYTE [DISTWIDTH] IN BLOOD BY AUTOMATED COUNT: 13.3 % (ref 11.6–17.2)
GFR SERPLBLD BASED ON 1.73 SQ M-ARVRAT: 94 ML/MIN (ref 89–?)
GLUCOSE SERPL-MCNC: 127 MG/DL (ref 74–106)
GLUCOSE UR STRIP-MCNC: (no result) MG/DL
HCO3 BLD-SCNC: 27.7 MEQ/L (ref 21–32)
HCT VFR BLD CALC: 35.2 % (ref 39–51)
HGB BLD-MCNC: 12.1 GM/DL (ref 13–17)
HGB UR QL STRIP: (no result)
HYALINE CASTS #/AREA URNS LPF: 3 /LPF
KETONES UR STRIP-MCNC: 10 MG/DL
LYMPHOCYTES # BLD AUTO: 1.2 TH/MM3 (ref 1–4.8)
LYMPHOCYTES NFR BLD AUTO: 10.3 % (ref 9–44)
MCH RBC QN AUTO: 32.9 PG (ref 27–34)
MCHC RBC AUTO-ENTMCNC: 34.4 % (ref 32–36)
MCV RBC AUTO: 95.5 FL (ref 80–100)
MONOCYTE #: 1.1 TH/MM3 (ref 0–0.9)
MONOCYTES NFR BLD: 9.2 % (ref 0–8)
MUCOUS THREADS #/AREA URNS LPF: (no result) /LPF
NEUTROPHILS # BLD AUTO: 9.5 TH/MM3 (ref 1.8–7.7)
NEUTROPHILS NFR BLD AUTO: 80.3 % (ref 16–70)
NITRITE UR QL STRIP: (no result)
PLATELET # BLD: 184 TH/MM3 (ref 150–450)
PMV BLD AUTO: 8.7 FL (ref 7–11)
RBC # BLD AUTO: 3.69 MIL/MM3 (ref 4.5–5.9)
SODIUM SERPL-SCNC: 139 MEQ/L (ref 136–145)
SP GR UR STRIP: 1.03 (ref 1–1.03)
URINE LEUKOCYTE ESTERASE: (no result)
WBC # BLD AUTO: 11.9 TH/MM3 (ref 4–11)

## 2018-06-12 RX ADMIN — LATANOPROST SCH DROP: 50 SOLUTION OPHTHALMIC at 20:40

## 2018-06-12 RX ADMIN — ACETAMINOPHEN AND CODEINE PHOSPHATE PRN TAB: 300; 30 TABLET ORAL at 12:31

## 2018-06-12 RX ADMIN — CYANOCOBALAMIN TAB 1000 MCG SCH MCG: 1000 TAB at 08:02

## 2018-06-12 RX ADMIN — LEVOBUNOLOL HYDROCHLORIDE SCH DROP: 5 SOLUTION/ DROPS OPHTHALMIC at 20:39

## 2018-06-12 RX ADMIN — RIVAROXABAN SCH MG: 20 TABLET, FILM COATED ORAL at 17:02

## 2018-06-12 RX ADMIN — CALCIUM CARBONATE-CHOLECALCIFEROL TAB 250 MG-125 UNIT SCH MG: 250-125 TAB at 12:28

## 2018-06-12 RX ADMIN — POTASSIUM CHLORIDE SCH MEQ: 750 TABLET, FILM COATED, EXTENDED RELEASE ORAL at 08:14

## 2018-06-12 RX ADMIN — ENOXAPARIN SODIUM SCH MG: 30 INJECTION SUBCUTANEOUS at 08:02

## 2018-06-12 RX ADMIN — CALCIUM CARBONATE-CHOLECALCIFEROL TAB 250 MG-125 UNIT SCH MG: 250-125 TAB at 18:00

## 2018-06-12 RX ADMIN — FUROSEMIDE SCH MG: 20 TABLET ORAL at 08:20

## 2018-06-12 RX ADMIN — FUROSEMIDE SCH MG: 20 TABLET ORAL at 08:14

## 2018-06-12 RX ADMIN — BISACODYL SCH MG: 10 SUPPOSITORY RECTAL at 08:14

## 2018-06-12 RX ADMIN — CALCIUM CARBONATE-CHOLECALCIFEROL TAB 250 MG-125 UNIT SCH MG: 250-125 TAB at 08:02

## 2018-06-12 RX ADMIN — MIRTAZAPINE SCH MG: 15 TABLET, FILM COATED ORAL at 20:40

## 2018-06-12 RX ADMIN — LEVOBUNOLOL HYDROCHLORIDE SCH DROP: 5 SOLUTION/ DROPS OPHTHALMIC at 08:03

## 2018-06-12 RX ADMIN — VITAMIN D, TAB 1000IU (100/BT) SCH UNITS: 25 TAB at 08:02

## 2018-06-12 RX ADMIN — ESCITALOPRAM OXALATE SCH MG: 20 TABLET ORAL at 08:02

## 2018-06-12 RX ADMIN — DOCUSATE SODIUM SCH MG: 100 CAPSULE, LIQUID FILLED ORAL at 08:13

## 2018-06-12 RX ADMIN — ACETAMINOPHEN AND CODEINE PHOSPHATE PRN TAB: 300; 30 TABLET ORAL at 08:03

## 2018-06-12 RX ADMIN — THYROID, PORCINE SCH MG: 30 TABLET ORAL at 08:14

## 2018-06-12 NOTE — PD.ORT.PN
Subjective


Subjective Remarks


POD 1 s/p IMN right hip


doing well. reports pain but controlled





Objective


Vitals





Vital Signs








  Date Time  Temp Pulse Resp B/P (MAP) Pulse Ox O2 Delivery O2 Flow Rate FiO2


 


6/11/18 04:00 98.1 85 18 100/64 (76) 96   


 


6/11/18 00:00 97.9 98 18 111/64 (80) 97   


 


6/10/18 19:30 98.9 87 18 110/59 (76) 96   


 


6/10/18 16:00 97.8 92 18 125/61 (82) 92   


 


6/10/18 12:00 97.5 76 18 127/55 (79) 98   


 


6/10/18 10:03 97.5 83 18 131/71 (91) 95   


 


6/10/18 09:37 98.0 78 18 144/69 (94) 97 Nasal Cannula 2 


 


6/10/18 09:15  79 18 120/68 (85) 97 Nasal Cannula 3 


 


6/10/18 09:00  81 17 149/72 (97) 97 Nasal Cannula 3 


 


6/10/18 08:49 97.6 85 17 159/75 (103) 95 Nasal Cannula 3 














I/O      


 


 6/10/18 6/10/18 6/10/18 6/11/18 6/11/18 6/11/18





 07:00 15:00 23:00 07:00 15:00 23:00


 


Intake Total 320 ml 600 ml  120 ml  


 


Output Total 1200 ml 1100 ml  450 ml  


 


Balance -880 ml -500 ml  -330 ml  


 


      


 


Intake Oral 320 ml   120 ml  


 


IV Total  600 ml    


 


Output Urine Total 1200 ml 1000 ml  450 ml  


 


Estimated Blood Loss  100 ml    


 


# Bowel Movements 0   1  








Result Diagram:  


6/8/18 1515                                                                    

            6/8/18 1515





Objective Remarks


RLE: dressings clean and dry.intact. NVI





Assessment & Plan


Assessment and Plan


1) Right Intertroch hip fx s/p IMN - POD 1


   -WBAT


   -daily dressing changes


   -CM for DC planning to SNf


   -f/u krysta or pa in 2 weeks











Mac Hendricks/First Assist PA Jun 11, 2018 06:43
Subjective


Subjective Remarks


POD 2 s/p IMN right hip


patient very confused overnight. does not know where he is at the moment





Objective


Vitals





Vital Signs








  Date Time  Temp Pulse Resp B/P (MAP) Pulse Ox O2 Delivery O2 Flow Rate FiO2


 


6/12/18 00:01 98.0 97 18 117/60 (79) 93   


 


6/11/18 20:00 98.4 91 18 131/63 (85) 95   


 


6/11/18 12:00 97.3 97 19 166/71 (102) 95   


 


6/11/18 08:00 98.0 78 17 119/57 (77) 97   














I/O      


 


 6/11/18 6/11/18 6/11/18 6/12/18 6/12/18 6/12/18





 07:00 15:00 23:00 07:00 15:00 23:00


 


Intake Total 120 ml   240 ml  


 


Output Total 450 ml   750 ml  


 


Balance -330 ml   -510 ml  


 


      


 


Intake Oral 120 ml   240 ml  


 


Output Urine Total 450 ml   750 ml  


 


# Bowel Movements 1     








Result Diagram:  


6/11/18 0930                                                                   

             6/8/18 1515





Objective Remarks


RLE: dressings clean and dry.intact. NVI





Assessment & Plan


Assessment and Plan


1) Right Intertroch hip fx s/p IMN - POD 2


   -WBAT


   -daily dressing changes


   -DVT prophylaxis with home dose xarelto


   -CM for DC planning to SNf


   -ortho clear for DC to SNF when medically stable


   -f/u [facility] or pa in 2 weeks











Mac Hendricks/First Assist PA Jun 12, 2018 06:35
100.4

## 2018-06-12 NOTE — PD.CONS
History of Present Illness


Service


Neurology


Consult Requested By


Medical for confusion


Primary Care Physician


Cristian Juárez, 


History of Present Illness


89-year-old gentleman admitted for fall.  Found to have right femur fracture 

which she had operated on.  Postop has been noted to be confused.  Has a 

pacemaker atrial fibrillation is on chronic anticoagulation.


Denies any headache fever chest pain dyspnea.  Does admit to some right leg 

weakness since fall.





Information obtained from medical chart due to patient's confusion





Review of Systems


12 point negative rest as above





Past Family Social History


Allergies:  


Coded Allergies:  


     fluoxetine (Unverified  Allergy, Severe, hallucinations, 8/15/17)


 along with tramadol caused hallucinations


     levobunolol (Unverified  Allergy, Severe, UNKNOWN-ON PTS HISTORY AND 

PHYSICAL, 8/15/17)


     tramadol (Unverified  Allergy, Severe, hallucinations, 8/15/17)


 along with prozac caused hallucinations


     azithromycin (Unverified  Allergy, Mild, rash, 8/15/17)


 hives


Reported Medications





Reported Meds & Active Scripts


Active


Reported


Stool Softener (Docusate Sodium) 100 Mg Tab 100 Mg PO DAILY


Dulcolax Supp (Bisacodyl) 10 Mg Supp 10 Mg RECTAL DAILY


Remeron (Mirtazapine) 15 Mg Tab 15 Mg PO HS


Vitamin D3 (Cholecalciferol) 2,000 Unit Cap 2,000 Units PO DAILY


Vitamin B-12 (Cyanocobalamin) 1,000 Mcg Tab 2,000 Mcg PO DAILY


Preservision Areds 2 Softgel (Vit C/E/Zn/Coppr/Lutein/Zeaxan) 250-200-40 

Capsule 1 Cap PO DAILY


Lexapro (Escitalopram Oxalate) 20 Mg Tab 20 Mg PO DAILY


Latanoprost Opth Drops (Latanoprost) 0.005% Drops 1 Drop EACH EYE HS


     Refrigerate until opened.


Levobunolol Opth Drops (Levobunolol HCl) 0.5% Drops 1 Drop EACH EYE BID


Potassium Chloride ER (Potassium Chloride) 10 Meq Tab 10 Meq PO DAILY


Lasix (Furosemide) 20 Mg Tab 20 Mg PO DAILY AT 12NOON


Lasix (Furosemide) 40 Mg Tab 40 Mg PO DAILY IN THE AM


Diltiazem ER 12 HR (Diltiazem HCl) 60 Mg Caper 60 Mg PO DAILY


[place] Thyroid (Thyroid) 15 Mg Tab 135 Mg PO DAILY


     Take 1 tablet (15mg) with a 120mg tablet for a total dose of 135mg


[place] Thyroid (Thyroid) 120 Mg Tab 135 Mg PO DAILY


     Take 1 tablet (120mg) with a 15mg tablet for a total dose of 135mg


Xarelto (Rivaroxaban) 20 Mg Tab 20 Mg PO DAILY


Active Ordered Medications


depression  a fib  hptn djd


Family History


father  in old age  mother  of cancer


Social History


non smoker   occ wine drinker





Review of Systems


All other ROS:  ROS reviewed as documented in chart





Past Family Social History


Allergies:  


Coded Allergies:  


     fluoxetine (Unverified  Allergy, Severe, hallucinations, 8/15/17)


 along with tramadol caused hallucinations


     levobunolol (Unverified  Allergy, Severe, UNKNOWN-ON PTS HISTORY AND 

PHYSICAL, 8/15/17)


     tramadol (Unverified  Allergy, Severe, hallucinations, 8/15/17)


 along with prozac caused hallucinations


     azithromycin (Unverified  Allergy, Mild, rash, 8/15/17)


 hives


Active Ordered Medications





Current Medications








 Medications


  (Trade)  Dose


 Ordered  Sig/Dee


 Route  Start Time


 Stop Time Status Last Admin


 


  (NS Flush)  2 ml  UNSCH  PRN


 IV FLUSH  18 15:15


    18 04:48


 


 


  (Dulcolax Supp)  10 mg  DAILY


 RECTAL  18 09:00


    18 09:27


 


 


  (Vitamin D3)  2,000 units  DAILY


 PO  18 09:00


    18 08:02


 


 


  (Vitamin B12)  2,000 mcg  DAILY


 PO  18 09:00


    18 08:02


 


 


  (Lexapro)  20 mg  DAILY


 PO  18 09:00


    18 08:02


 


 


  (Lasix)  20 mg  DAILY


 PO  18 09:00


    18 09:26


 


 


  (Xalatan 0.005%


 Opth Soln)  1 drop  HS


 EACH EYE  18 21:00


    18 21:27


 


 


  (Betagan


 Liquifilm 0.5%)  1 drop  BID


 EACH EYE  18 21:00


    18 08:03


 


 


  (Remeron)  15 mg  HS


 PO  18 21:00


    18 21:27


 


 


  (KCl)  10 meq  DAILY


 PO  18 09:00


    18 08:14


 


 


 Patient Own


 Medication  PT OWN


 MED:


 DILTIA...  DAILY


 PO  18 09:00


   Future Hold  


 


 


  (Colace)  100 mg  DAILY


 PO  18 09:00


    18 08:13


 


 


 Patient Own


 Medication  PT OWN MED:


 PRESERVISION


 AREDS 2...  DAILY


 PO  18 09:00


   Future Hold  


 


 


  (Morphine Inj)  2 mg  Q4H  PRN


 IV  18 21:30


    6/10/18 16:00


 


 


  (Lovenox Inj)  30 mg  Q24H


 SQ  18 08:00


    18 08:02


 


 


  (Oscal-D 250-125)  250 mg  TID


 PO  6/10/18 09:00


    18 12:28


 


 


  (Benadryl)  25 mg  Q6H  PRN


 PO  6/10/18 08:30


     


 


 


  (Ativan)  0.5 mg  Q8H  PRN


 PO  6/10/18 16:45


     


 


 


  (risperDAL)  0.25 mg  BID


 PO  18 21:00


    18 08:14


 


 


  (Tylenol-Codeine


 #3)  1 tab  Q4H  PRN


 PO  18 17:45


    18 12:31


 


 


  (Tylenol-Codeine


 #3)  2 tab  Q4H  PRN


 PO  18 17:45


     


 


 


  ([place] Thyroid)  135 mg  DAILY


 PO  18 09:00


    18 08:14


 


 


  (Pill Splitter)  1 ea  UNSCH  PRN


 OTHER  18 19:45


     


 











Exam


I&O / VS





Vital Signs








  Date Time  Temp Pulse Resp B/P (MAP) Pulse Ox O2 Delivery O2 Flow Rate FiO2


 


18 12:00 97.5 98 18 123/60 (81) 95   


 


18 08:00 97.9 99 18 117/58 (77) 92   


 


18 07:39      Room Air  


 


18 00:01 98.0 97 18 117/60 (79) 93   


 


18 20:00 98.4 91 18 131/63 (85) 95   








General:  No acute distress


Eye:  EOMI


Respiratory:  Non-labored respirations


Neurologic:  Alert


Psychiatric:  Cooperative


Exam Comments


89-year-old gentleman sitting up in chair comfortably awake alert oriented to 

himself and place was able given him his primary care physician.  Little 

confused and appear to have occasional visual hallucination.  Can follow once a 

motor request.  Able to name simple objects.  Current president states Trump. 

Extraocular movements intact left surgical cataract extraction change noted 

visual fields full no facial asymmetry [place] all 4 extremity to gravity with 

the exception of reduced range of motion right upper extremity with abduction 

past shoulder and right lower extremity hip flexor paresis related to his 

recent fracture reflexes symmetric





Review/Management


Diagnosis/Plan:  


(1) Encephalopathy, metabolic


ICD Codes:  G93.41 - Metabolic encephalopathy


Status:  Acute


Plan:  Current postop


May relate a postanesthesia effect versus embolic infarcts


History of atrial fibrillation





Recommendations


Would reinitiate anticoagulation if feasible


CT brain noncontrast


Check a UA


EEG


Follow exam





(2) Pacemaker


ICD Codes:  Z95.0 - Presence of cardiac pacemaker


Status:  Acute


(3) Afib


ICD Codes:  I48.91 - Unspecified atrial fibrillation


Status:  Chronic


Plan:  Usually takes Xarelto daily





(4) Intertrochanteric fracture of right hip


ICD Codes:  S72.141A - Displaced intertrochanteric fracture of right femur, 

initial encounter for closed fracture


Status:  Acute


Plan:  Postop, or the following








Problem Qualifiers





(1) Afib:  


Qualified Codes:  I48.2 - Chronic atrial fibrillation


(2) Intertrochanteric fracture of right hip:  


Qualified Codes:  S72.144A - Nondisplaced intertrochanteric fracture of right 

femur, initial encounter for closed fracture








Dontae Martinez MD 2018 15:26

## 2018-06-12 NOTE — HHI.PR
Subjective


Remarks


Very confused this am and through night


S/P ORIF


PO day 2. No reports of pain





Objective





Vital Signs








  Date Time  Temp Pulse Resp B/P (MAP) Pulse Ox O2 Delivery O2 Flow Rate FiO2


 


6/12/18 08:00 97.9 99 18 117/58 (77) 92   


 


6/12/18 07:39      Room Air  


 


6/12/18 00:01 98.0 97 18 117/60 (79) 93   


 


6/11/18 20:00 98.4 91 18 131/63 (85) 95   


 


6/11/18 12:00 97.3 97 19 166/71 (102) 95   














I/O      


 


 6/11/18 6/11/18 6/11/18 6/12/18 6/12/18 6/12/18





 07:00 15:00 23:00 07:00 15:00 23:00


 


Intake Total 120 ml   240 ml  


 


Output Total 450 ml   750 ml  


 


Balance -330 ml   -510 ml  


 


      


 


Intake Oral 120 ml   240 ml  


 


Output Urine Total 450 ml   750 ml  


 


# Bowel Movements 1     








Result Diagram:  


6/11/18 0930                                                                   

             6/8/18 1515





Procedures


ORIF right hip fracture


Objective Remarks


Objective Remarks


GENERAL: Alert confused confused


SKIN: Warm and dry.


HEAD: Normocephalic.


EYES: No scleral icterus. No injection or drainage. 


NECK: Supple, trachea midline. No JVD or lymphadenopathy.


CARDIOVASCULAR: Regular rate and rhythm without murmurs, gallops, or rubs. 


RESPIRATORY: Breath sounds equal bilaterally, diminished at base


GASTROINTESTINAL: Abdomen soft,distended, + BS


MUSCULOSKELETAL: No cyanosis, or edema.


Medications and IVs





Current Medications








 Medications


  (Trade)  Dose


 Ordered  Sig/Dee


 Route  Start Time


 Stop Time Status Last Admin


 


  (NS Flush)  2 ml  UNSCH  PRN


 IV FLUSH  6/8/18 15:15


    6/11/18 04:48


 


 


  (Dulcolax Supp)  10 mg  DAILY


 RECTAL  6/9/18 09:00


    6/11/18 09:27


 


 


  (Vitamin D3)  2,000 units  DAILY


 PO  6/9/18 09:00


    6/12/18 08:02


 


 


  (Vitamin B12)  2,000 mcg  DAILY


 PO  6/9/18 09:00


    6/12/18 08:02


 


 


  (Lexapro)  20 mg  DAILY


 PO  6/9/18 09:00


    6/12/18 08:02


 


 


  (Lasix)  20 mg  DAILY


 PO  6/9/18 09:00


    6/11/18 09:26


 


 


  (Xalatan 0.005%


 Opth Soln)  1 drop  HS


 EACH EYE  6/8/18 21:00


    6/11/18 21:27


 


 


  (Betagan


 Liquifilm 0.5%)  1 drop  BID


 EACH EYE  6/8/18 21:00


    6/12/18 08:03


 


 


  (Remeron)  15 mg  HS


 PO  6/8/18 21:00


    6/11/18 21:27


 


 


  (KCl)  10 meq  DAILY


 PO  6/9/18 09:00


    6/12/18 08:14


 


 


 Patient Own


 Medication  PT OWN


 MED:


 DILTIA...  DAILY


 PO  6/9/18 09:00


   Future Hold  


 


 


  (Colace)  100 mg  DAILY


 PO  6/9/18 09:00


    6/12/18 08:13


 


 


 Patient Own


 Medication  PT OWN MED:


 PRESERVISION


 AREDS 2...  DAILY


 PO  6/9/18 09:00


   Future Hold  


 


 


  (Morphine Inj)  2 mg  Q4H  PRN


 IV  6/8/18 21:30


    6/10/18 16:00


 


 


  (Lovenox Inj)  30 mg  Q24H


 SQ  6/11/18 08:00


    6/12/18 08:02


 


 


  (Oscal-D 250-125)  250 mg  TID


 PO  6/10/18 09:00


    6/12/18 08:02


 


 


  (Benadryl)  25 mg  Q6H  PRN


 PO  6/10/18 08:30


     


 


 


  (Ativan)  0.5 mg  Q8H  PRN


 PO  6/10/18 16:45


     


 


 


  (risperDAL)  0.25 mg  BID


 PO  6/11/18 21:00


    6/12/18 08:14


 


 


  (Tylenol-Codeine


 #3)  1 tab  Q4H  PRN


 PO  6/11/18 17:45


    6/12/18 08:03


 


 


  (Tylenol-Codeine


 #3)  2 tab  Q4H  PRN


 PO  6/11/18 17:45


     


 


 


  ([place] Thyroid)  135 mg  DAILY


 PO  6/12/18 09:00


    6/12/18 08:14


 


 


  (Pill Splitter)  1 ea  UNSCH  PRN


 OTHER  6/11/18 19:45


     


 











Assessment and Plan


Problem List:  


(1) Intertrochanteric fracture of right hip


ICD Codes:  S72.141A - Displaced intertrochanteric fracture of right femur, 

initial encounter for closed fracture


Status:  Acute


(2) Afib


ICD Codes:  I48.91 - Unspecified atrial fibrillation


Status:  Acute


Plan:  Rate controlled, Lovenox





(3) Hypothyroid


ICD Codes:  E03.9 - Hypothyroidism, unspecified


Plan:  Cont home medication, 





(4) Depression


ICD Codes:  F32.9 - Major depressive disorder, single episode, unspecified


Status:  Acute


Plan:  Cont Lexapro





Assessment and Plan


6/11/18- Up in chair this am with spouse at bedside. He is pleasantly confused. 

Spouse voices it related to MS. He is on o2 2liters, pain currently controlled.


He voices he does not want rehab he is going home, spouse voices he will need 

rehab. Will refer to [facility]. HX of Afib, rate controlled on Lovenox. Labs in am 


Labs in am





6/12/18- Patient confused through night, and this am. Not sure where he is and 

what happened. Mediations adjusted, pain Meds held. Will consult neurology. VSS 

afebrile.





Problem Qualifiers





(1) Intertrochanteric fracture of right hip:  


Qualified Codes:  S72.144A - Nondisplaced intertrochanteric fracture of right 

femur, initial encounter for closed fracture








Ayesha Frye Jun 12, 2018 09:56

## 2018-06-13 VITALS
DIASTOLIC BLOOD PRESSURE: 70 MMHG | RESPIRATION RATE: 18 BRPM | HEART RATE: 100 BPM | OXYGEN SATURATION: 95 % | SYSTOLIC BLOOD PRESSURE: 138 MMHG | TEMPERATURE: 98 F

## 2018-06-13 VITALS
OXYGEN SATURATION: 95 % | SYSTOLIC BLOOD PRESSURE: 136 MMHG | DIASTOLIC BLOOD PRESSURE: 64 MMHG | RESPIRATION RATE: 18 BRPM | HEART RATE: 103 BPM | TEMPERATURE: 98.1 F

## 2018-06-13 VITALS
DIASTOLIC BLOOD PRESSURE: 92 MMHG | SYSTOLIC BLOOD PRESSURE: 148 MMHG | TEMPERATURE: 97.4 F | OXYGEN SATURATION: 96 % | HEART RATE: 108 BPM | RESPIRATION RATE: 19 BRPM

## 2018-06-13 VITALS
RESPIRATION RATE: 18 BRPM | OXYGEN SATURATION: 95 % | TEMPERATURE: 98.9 F | DIASTOLIC BLOOD PRESSURE: 74 MMHG | SYSTOLIC BLOOD PRESSURE: 158 MMHG | HEART RATE: 95 BPM

## 2018-06-13 VITALS
DIASTOLIC BLOOD PRESSURE: 65 MMHG | HEART RATE: 99 BPM | SYSTOLIC BLOOD PRESSURE: 128 MMHG | OXYGEN SATURATION: 94 % | TEMPERATURE: 97.5 F | RESPIRATION RATE: 18 BRPM

## 2018-06-13 LAB
BASOPHILS # BLD AUTO: 0 TH/MM3 (ref 0–0.2)
BASOPHILS NFR BLD: 0.3 % (ref 0–2)
EOSINOPHIL # BLD: 0.1 TH/MM3 (ref 0–0.4)
EOSINOPHIL NFR BLD: 0.5 % (ref 0–4)
ERYTHROCYTE [DISTWIDTH] IN BLOOD BY AUTOMATED COUNT: 13.2 % (ref 11.6–17.2)
HCT VFR BLD CALC: 34.3 % (ref 39–51)
HGB BLD-MCNC: 11.8 GM/DL (ref 13–17)
LYMPHOCYTES # BLD AUTO: 1.5 TH/MM3 (ref 1–4.8)
LYMPHOCYTES NFR BLD AUTO: 12.3 % (ref 9–44)
MCH RBC QN AUTO: 32.4 PG (ref 27–34)
MCHC RBC AUTO-ENTMCNC: 34.4 % (ref 32–36)
MCV RBC AUTO: 94.3 FL (ref 80–100)
MONOCYTE #: 1.2 TH/MM3 (ref 0–0.9)
MONOCYTES NFR BLD: 10.3 % (ref 0–8)
NEUTROPHILS # BLD AUTO: 9.1 TH/MM3 (ref 1.8–7.7)
NEUTROPHILS NFR BLD AUTO: 76.6 % (ref 16–70)
PLATELET # BLD: 226 TH/MM3 (ref 150–450)
PMV BLD AUTO: 8.4 FL (ref 7–11)
RBC # BLD AUTO: 3.64 MIL/MM3 (ref 4.5–5.9)
WBC # BLD AUTO: 11.9 TH/MM3 (ref 4–11)

## 2018-06-13 RX ADMIN — ACETAMINOPHEN AND CODEINE PHOSPHATE PRN TAB: 300; 30 TABLET ORAL at 20:39

## 2018-06-13 RX ADMIN — MIRTAZAPINE SCH MG: 15 TABLET, FILM COATED ORAL at 20:39

## 2018-06-13 RX ADMIN — POTASSIUM CHLORIDE SCH MEQ: 750 TABLET, FILM COATED, EXTENDED RELEASE ORAL at 09:20

## 2018-06-13 RX ADMIN — CALCIUM CARBONATE-CHOLECALCIFEROL TAB 250 MG-125 UNIT SCH MG: 250-125 TAB at 17:33

## 2018-06-13 RX ADMIN — BISACODYL SCH MG: 10 SUPPOSITORY RECTAL at 09:00

## 2018-06-13 RX ADMIN — RIVAROXABAN SCH MG: 20 TABLET, FILM COATED ORAL at 09:20

## 2018-06-13 RX ADMIN — CALCIUM CARBONATE-CHOLECALCIFEROL TAB 250 MG-125 UNIT SCH MG: 250-125 TAB at 09:19

## 2018-06-13 RX ADMIN — CALCIUM CARBONATE-CHOLECALCIFEROL TAB 250 MG-125 UNIT SCH MG: 250-125 TAB at 14:37

## 2018-06-13 RX ADMIN — LEVOBUNOLOL HYDROCHLORIDE SCH DROP: 5 SOLUTION/ DROPS OPHTHALMIC at 20:39

## 2018-06-13 RX ADMIN — THYROID, PORCINE SCH MG: 30 TABLET ORAL at 09:19

## 2018-06-13 RX ADMIN — VITAMIN D, TAB 1000IU (100/BT) SCH UNITS: 25 TAB at 09:00

## 2018-06-13 RX ADMIN — DOCUSATE SODIUM SCH MG: 100 CAPSULE, LIQUID FILLED ORAL at 09:19

## 2018-06-13 RX ADMIN — ACETAMINOPHEN AND CODEINE PHOSPHATE PRN TAB: 300; 30 TABLET ORAL at 14:37

## 2018-06-13 RX ADMIN — LATANOPROST SCH DROP: 50 SOLUTION OPHTHALMIC at 20:39

## 2018-06-13 RX ADMIN — FUROSEMIDE SCH MG: 20 TABLET ORAL at 09:00

## 2018-06-13 RX ADMIN — CYANOCOBALAMIN TAB 1000 MCG SCH MCG: 1000 TAB at 09:20

## 2018-06-13 RX ADMIN — ACETAMINOPHEN AND CODEINE PHOSPHATE PRN TAB: 300; 30 TABLET ORAL at 09:20

## 2018-06-13 RX ADMIN — ESCITALOPRAM OXALATE SCH MG: 20 TABLET ORAL at 09:19

## 2018-06-13 RX ADMIN — LEVOBUNOLOL HYDROCHLORIDE SCH DROP: 5 SOLUTION/ DROPS OPHTHALMIC at 09:21

## 2018-06-13 NOTE — HHI.PR
Subjective


Remarks


Very confused this am and through night


S/P ORIF





Objective





Vital Signs








  Date Time  Temp Pulse Resp B/P (MAP) Pulse Ox O2 Delivery O2 Flow Rate FiO2


 


6/13/18 06:55      Room Air  


 


6/13/18 00:01 97.5 99 18 128/65 (86) 94   


 


6/12/18 20:00 97.4 105 19 134/75 (94) 96   


 


6/12/18 16:00 97.5 101 18 134/70 (91) 94   


 


6/12/18 12:00 97.5 98 18 123/60 (81) 95   














I/O      


 


 6/12/18 6/12/18 6/12/18 6/13/18 6/13/18 6/13/18





 07:00 15:00 23:00 07:00 15:00 23:00


 


Intake Total 240 ml 240 ml  240 ml  


 


Output Total 750 ml   725 ml  


 


Balance -510 ml 240 ml  -485 ml  


 


      


 


Intake Oral 240 ml 240 ml  240 ml  


 


Output Urine Total 750 ml   725 ml  


 


# Voids  1    


 


# Bowel Movements  0  2  








Result Diagram:  


6/12/18 1210                                                                   

             6/12/18 1210





Procedures


ORIF right hip fracture


Objective Remarks


Objective Remarks


GENERAL: Alert confused confused


SKIN: Warm and dry.


HEAD: Normocephalic.


EYES: No scleral icterus. No injection or drainage. 


NECK: Supple, trachea midline. No JVD or lymphadenopathy.


CARDIOVASCULAR: Regular rate and rhythm without murmurs, gallops, or rubs. 


RESPIRATORY: Breath sounds equal bilaterally, diminished at base


Male - Penis Ecchymotic, no discharge, or palpable lumps


GASTROINTESTINAL: Abdomen soft,distended, + BS


MUSCULOSKELETAL: No cyanosis, or edema.


Medications and IVs





Current Medications








 Medications


  (Trade)  Dose


 Ordered  Sig/Dee


 Route  Start Time


 Stop Time Status Last Admin


 


  (NS Flush)  2 ml  UNSCH  PRN


 IV FLUSH  6/8/18 15:15


    6/11/18 04:48


 


 


  (Dulcolax Supp)  10 mg  DAILY


 RECTAL  6/9/18 09:00


    6/11/18 09:27


 


 


  (Vitamin D3)  2,000 units  DAILY


 PO  6/9/18 09:00


    6/12/18 08:02


 


 


  (Vitamin B12)  2,000 mcg  DAILY


 PO  6/9/18 09:00


    6/12/18 08:02


 


 


  (Lexapro)  20 mg  DAILY


 PO  6/9/18 09:00


    6/12/18 08:02


 


 


  (Lasix)  20 mg  DAILY


 PO  6/9/18 09:00


    6/11/18 09:26


 


 


  (Xalatan 0.005%


 Opth Soln)  1 drop  HS


 EACH EYE  6/8/18 21:00


    6/12/18 20:40


 


 


  (Betagan


 Liquifilm 0.5%)  1 drop  BID


 EACH EYE  6/8/18 21:00


    6/12/18 20:39


 


 


  (Remeron)  15 mg  HS


 PO  6/8/18 21:00


    6/12/18 20:40


 


 


  (KCl)  10 meq  DAILY


 PO  6/9/18 09:00


    6/12/18 08:14


 


 


 Patient Own


 Medication  PT OWN


 MED:


 DILTIA...  DAILY


 PO  6/9/18 09:00


   Future Hold  


 


 


  (Colace)  100 mg  DAILY


 PO  6/9/18 09:00


    6/12/18 08:13


 


 


 Patient Own


 Medication  PT OWN MED:


 PRESERVISION


 AREDS 2...  DAILY


 PO  6/9/18 09:00


   Future Hold  


 


 


  (Morphine Inj)  2 mg  Q4H  PRN


 IV  6/8/18 21:30


    6/10/18 16:00


 


 


  (Oscal-D 250-125)  250 mg  TID


 PO  6/10/18 09:00


    6/12/18 18:00


 


 


  (Ativan)  0.5 mg  Q8H  PRN


 PO  6/10/18 16:45


     


 


 


  (risperDAL)  0.25 mg  BID


 PO  6/11/18 21:00


    6/12/18 20:40


 


 


  (Tylenol-Codeine


 #3)  1 tab  Q4H  PRN


 PO  6/11/18 17:45


    6/12/18 12:31


 


 


  (Tylenol-Codeine


 #3)  2 tab  Q4H  PRN


 PO  6/11/18 17:45


     


 


 


  ([place] Thyroid)  135 mg  DAILY


 PO  6/12/18 09:00


    6/12/18 08:14


 


 


  (Pill Splitter)  1 ea  UNSCH  PRN


 OTHER  6/11/18 19:45


     


 


 


  (Xarelto)  20 mg  DAILY


 PO  6/12/18 16:00


    6/12/18 17:02


 











Assessment and Plan


Problem List:  


(1) Intertrochanteric fracture of right hip


ICD Codes:  S72.141A - Displaced intertrochanteric fracture of right femur, 

initial encounter for closed fracture


Status:  Acute


(2) Afib


ICD Codes:  I48.91 - Unspecified atrial fibrillation


Status:  Chronic


Plan:  Rate controlled, Lovenox





(3) Hypothyroid


ICD Codes:  E03.9 - Hypothyroidism, unspecified


Plan:  Cont home medication, 





(4) Depression


ICD Codes:  F32.9 - Major depressive disorder, single episode, unspecified


Status:  Acute


Plan:  Cont Lexapro





Assessment and Plan


6/11/18- Up in chair this am with spouse at bedside. He is pleasantly confused. 

Spouse voices it related to MS. He is on o2 2liters, pain currently controlled.


He voices he does not want rehab he is going home, spouse voices he will need 

rehab. Will refer to [place]. HX of Afib, rate controlled on Lovenox. Labs in am 


Labs in am





6/12/18- Patient confused through night, and this am. Not sure where he is and 

what happened. Mediations adjusted, pain Meds held. Will consult neurology. VSS 

afebrile. 





6/131/8- More confused this am. Nonsensical talk. VSS, He does have noted 

ecchymosis to complete penis, No pain, no noted injury or lumps/bumps. No noted 

discharge, he is on blood thinner. 


Seen by Neuro yesterday, CT completed not dictated. EEG to be done today. 

Likely confusion r/t anaesthesia and pain medications. He has been accepted at 

[facility], Once test complete and cleared with Neuro may be dc'd. 


Discussed with nurse.





Problem Qualifiers





(1) Intertrochanteric fracture of right hip:  


Qualified Codes:  S72.144A - Nondisplaced intertrochanteric fracture of right 

femur, initial encounter for closed fracture


(2) Afib:  


Qualified Codes:  I48.2 - Chronic atrial fibrillation








Ayesha Frye Jun 13, 2018 08:10

## 2018-06-13 NOTE — RADRPT
EXAM DATE:  6/12/2018 9:45 PM EDT

AGE/SEX:        89 years / Male



INDICATIONS:  Altered mental status.



CLINICAL DATA:  This is the patient's initial encounter. Patient reports that signs and symptoms have
 been present for 1 day and indicates a pain score of 0/10. 

                                                                          

MEDICAL/SURGICAL HISTORY:   Carcinoma, prostatic.  Hypertension. Pacemaker.  Prostatectomy.  Inguinal
 hernia repair.  Appendectomy.



RADIATION DOSE:  66.26 CTDI (mGy)







[place]      [facility] [place].  .





TECHNIQUE:  CT of the head without contrast.  Using automated exposure control and adjustment of the 
mA and/or kV according to patient size, radiation dose was kept as low as reasonably achievable to ob
tain optimal diagnostic quality images.



FINDINGS: 

There is central and cortical atrophy with dilatation of ventricular and sulcal spaces.  There is no 
parenchymal hemorrhage, acute infarction or mass lesion identified.  There are no extra-axial fluid c
ollections appreciated.  Periventricular white matter changes are noted. The posterior fossa is unrem
arkable with midline fourth ventricle. The portion of the orbits and paranasal sinuses visualized are
 unremarkable. 



CONCLUSION:  Atrophy, otherwise negative for an acute process. 



Farhat Rivas MD  FACR 



Electronically signed by: Farhat Rivas MD  6/12/2018 9:47 PM EDT

## 2018-06-13 NOTE — MG
cc:

Dontae Martinez MD, Mandeep MD

****

 

 

EEG NUMBER 

 

6-8 Hz activity, 20-40 microvolts in the posterior channels, frontal 

beta, theta frequencies.  Slow eye movements, attenuation of 

background, occasional delta bursts suggestive of drowsy state.  

Limited driving with photic stimulation.  Single lead EKG showing an 

irregularly irregular rhythm.

 

INTERPRETATION:

Very minimal encephalopathy and drowsy state.  Cardiac arrhythmia.

 

 

__________________________________

MD NEERU Mtz/

D: 06/13/2018, 09:09 PM

T: 06/13/2018, 09:33 PM

Visit #: [id number]

Job #: [id number]

## 2018-06-14 VITALS
HEART RATE: 99 BPM | SYSTOLIC BLOOD PRESSURE: 110 MMHG | RESPIRATION RATE: 18 BRPM | OXYGEN SATURATION: 94 % | TEMPERATURE: 97.9 F | DIASTOLIC BLOOD PRESSURE: 55 MMHG

## 2018-06-14 VITALS
TEMPERATURE: 98.8 F | RESPIRATION RATE: 21 BRPM | HEART RATE: 91 BPM | DIASTOLIC BLOOD PRESSURE: 66 MMHG | SYSTOLIC BLOOD PRESSURE: 124 MMHG | OXYGEN SATURATION: 95 %

## 2018-06-14 RX ADMIN — THYROID, PORCINE SCH MG: 30 TABLET ORAL at 08:45

## 2018-06-14 RX ADMIN — CYANOCOBALAMIN TAB 1000 MCG SCH MCG: 1000 TAB at 08:46

## 2018-06-14 RX ADMIN — VITAMIN D, TAB 1000IU (100/BT) SCH UNITS: 25 TAB at 08:46

## 2018-06-14 RX ADMIN — ESCITALOPRAM OXALATE SCH MG: 20 TABLET ORAL at 08:45

## 2018-06-14 RX ADMIN — LEVOBUNOLOL HYDROCHLORIDE SCH DROP: 5 SOLUTION/ DROPS OPHTHALMIC at 08:45

## 2018-06-14 RX ADMIN — BISACODYL SCH MG: 10 SUPPOSITORY RECTAL at 07:40

## 2018-06-14 RX ADMIN — CALCIUM CARBONATE-CHOLECALCIFEROL TAB 250 MG-125 UNIT SCH MG: 250-125 TAB at 12:01

## 2018-06-14 RX ADMIN — FUROSEMIDE SCH MG: 20 TABLET ORAL at 08:45

## 2018-06-14 RX ADMIN — DOCUSATE SODIUM SCH MG: 100 CAPSULE, LIQUID FILLED ORAL at 08:46

## 2018-06-14 RX ADMIN — RIVAROXABAN SCH MG: 20 TABLET, FILM COATED ORAL at 08:46

## 2018-06-14 RX ADMIN — CALCIUM CARBONATE-CHOLECALCIFEROL TAB 250 MG-125 UNIT SCH MG: 250-125 TAB at 08:46

## 2018-06-14 RX ADMIN — POTASSIUM CHLORIDE SCH MEQ: 750 TABLET, FILM COATED, EXTENDED RELEASE ORAL at 08:46

## 2018-06-14 NOTE — HHI.PR
Subjective


Remarks


Very sleepy this am, but abusable 


S/P ORIF





Objective





Vital Signs








  Date Time  Temp Pulse Resp B/P (MAP) Pulse Ox O2 Delivery O2 Flow Rate FiO2


 


6/14/18 08:00 98.8 91 21 124/66 (85) 95   


 


6/14/18 07:53      Room Air  


 


6/14/18 00:01 97.9 99 18 110/55 (73) 94   


 


6/13/18 20:00 97.4 108 19 148/92 (110) 96   


 


6/13/18 16:00 98.9 95 18 158/74 (102) 95   


 


6/13/18 12:00 98.0 100 18 138/70 (92) 95   














I/O      


 


 6/13/18 6/13/18 6/13/18 6/14/18 6/14/18 6/14/18





 07:00 15:00 23:00 07:00 15:00 23:00


 


Intake Total 240 ml   220 ml  


 


Output Total 725 ml     


 


Balance -485 ml   220 ml  


 


      


 


Intake Oral 240 ml   220 ml  


 


Output Urine Total 725 ml     


 


# Voids   2 1  


 


# Bowel Movements 2     








Result Diagram:  


6/13/18 1145                                                                   

             6/12/18 1210





Imaging





Last 72 hours Impressions








Head CT 6/12/18 0000 Signed





Impressions: 





 CONCLUSION:  Atrophy, otherwise negative for an acute process. 





  





 





 Farhat Rivas MD  FACR 





  





 








Procedures


ORIF right hip fracture


EEG 6/16/18


Objective Remarks


Objective Remarks


GENERAL: well nourished male no distress, sleepy


SKIN: Warm and dry.


HEAD: Normocephalic.


EYES: No scleral icterus. No injection or drainage. 


NECK: Supple, trachea midline. No JVD or lymphadenopathy.


CARDIOVASCULAR: Regular rate and rhythm without murmurs, gallops, or rubs. 


RESPIRATORY: Breath sounds equal bilaterally, diminished at base


Male - Penis Ecchymotic, no discharge, or palpable lumps


GASTROINTESTINAL: Abdomen soft,distended, + BS


MUSCULOSKELETAL: No cyanosis, or edema.


Medications and IVs





Current Medications








 Medications


  (Trade)  Dose


 Ordered  Sig/Dee


 Route  Start Time


 Stop Time Status Last Admin


 


  (NS Flush)  2 ml  UNSCH  PRN


 IV FLUSH  6/8/18 15:15


    6/11/18 04:48


 


 


  (Dulcolax Supp)  10 mg  DAILY


 RECTAL  6/9/18 09:00


    6/11/18 09:27


 


 


  (Vitamin D3)  2,000 units  DAILY


 PO  6/9/18 09:00


    6/14/18 08:46


 


 


  (Vitamin B12)  2,000 mcg  DAILY


 PO  6/9/18 09:00


    6/14/18 08:46


 


 


  (Lexapro)  20 mg  DAILY


 PO  6/9/18 09:00


    6/14/18 08:45


 


 


  (Lasix)  20 mg  DAILY


 PO  6/9/18 09:00


    6/14/18 08:45


 


 


  (Xalatan 0.005%


 Opth Soln)  1 drop  HS


 EACH EYE  6/8/18 21:00


    6/13/18 20:39


 


 


  (Betagan


 Liquifilm 0.5%)  1 drop  BID


 EACH EYE  6/8/18 21:00


    6/14/18 08:45


 


 


  (KCl)  10 meq  DAILY


 PO  6/9/18 09:00


    6/14/18 08:46


 


 


 Patient Own


 Medication  PT OWN


 MED:


 DILTIA...  DAILY


 PO  6/9/18 09:00


   Future Hold  


 


 


  (Colace)  100 mg  DAILY


 PO  6/9/18 09:00


    6/14/18 08:46


 


 


 Patient Own


 Medication  PT OWN MED:


 PRESERVISION


 AREDS 2...  DAILY


 PO  6/9/18 09:00


   Future Hold  


 


 


  (Morphine Inj)  2 mg  Q4H  PRN


 IV  6/8/18 21:30


    6/10/18 16:00


 


 


  (Oscal-D 250-125)  250 mg  TID


 PO  6/10/18 09:00


    6/14/18 08:46


 


 


  (Ativan)  0.5 mg  Q8H  PRN


 PO  6/10/18 16:45


     


 


 


  (risperDAL)  0.25 mg  BID


 PO  6/11/18 21:00


    6/14/18 08:46


 


 


  (Tylenol-Codeine


 #3)  1 tab  Q4H  PRN


 PO  6/11/18 17:45


    6/13/18 20:39


 


 


  (Tylenol-Codeine


 #3)  2 tab  Q4H  PRN


 PO  6/11/18 17:45


     


 


 


  ([place] Thyroid)  135 mg  DAILY


 PO  6/12/18 09:00


    6/14/18 08:45


 


 


  (Pill Splitter)  1 ea  UNSCH  PRN


 OTHER  6/11/18 19:45


     


 


 


  (Xarelto)  20 mg  DAILY


 PO  6/12/18 16:00


    6/14/18 08:46


 


 


  (Remeron)  7.5 mg  HS


 PO  6/14/18 21:00


   UNV  


 











Assessment and Plan


Problem List:  


(1) Intertrochanteric fracture of right hip


ICD Codes:  S72.141A - Displaced intertrochanteric fracture of right femur, 

initial encounter for closed fracture


Status:  Acute


(2) Afib


ICD Codes:  I48.91 - Unspecified atrial fibrillation


Status:  Chronic


Plan:  Rate controlled, Lovenox





(3) Hypothyroid


ICD Codes:  E03.9 - Hypothyroidism, unspecified


Plan:  Cont home medication, 





(4) Depression


ICD Codes:  F32.9 - Major depressive disorder, single episode, unspecified


Status:  Acute


Plan:  Cont Lexapro





Assessment and Plan


6/11/18- Up in chair this am with spouse at bedside. He is pleasantly confused. 

Spouse voices it related to MS. He is on o2 2liters, pain currently controlled.


He voices he does not want rehab he is going home, spouse voices he will need 

rehab. Will refer to [facility]. HX of Afib, rate controlled on Lovenox. Labs in am 


Labs in am





6/12/18- Patient confused through night, and this am. Not sure where he is and 

what happened. Mediations adjusted, pain Meds held. Will consult neurology. VSS 

afebrile. 





6/131/8- More confused this am. Nonsensical talk. VSS, He does have noted 

ecchymosis to complete penis, No pain, no noted injury or lumps/bumps. No noted 

discharge, he is on blood thinner. 


Seen by Neuro yesterday, CT completed not dictated. EEG to be done today. 

Likely confusion r/t anaesthesia and pain medications. He has been accepted at 

[facility], Once test complete and cleared with Neuro may be dc'd. 


Discussed with nurse. 





6/14/18-  No complaints over night. He is sleepy today, but abusable. VSS, Sat'

s maintained on room air. EEG completed yesterday, CT head negative for acute 

process. on Xarelto for afib, rate controlled. Accepted to [facility]


.





Problem Qualifiers





(1) Intertrochanteric fracture of right hip:  


Qualified Codes:  S72.144A - Nondisplaced intertrochanteric fracture of right 

femur, initial encounter for closed fracture


(2) Afib:  


Qualified Codes:  I48.2 - Chronic atrial fibrillation








Ayesha Frye Jun 14, 2018 09:56

## 2018-06-14 NOTE — HHI.DS
Discharge Summary


Admission Date


Jun 8, 2018 at 17:12


Admitting Diagnosis





Right hip fracture





Procedures


ORIF right hip fracture


EEG 6/16/18


CBC/BMP:  


6/13/18 1145                                                                   

             6/12/18 1210





Significant Findings





Laboratory Tests








Test


  6/12/18


12:10 6/12/18


20:37 6/13/18


11:45


 


White Blood Count


  11.9 TH/MM3


(4.0-11.0) 


  11.9 TH/MM3


(4.0-11.0)


 


Red Blood Count


  3.69 MIL/MM3


(4.50-5.90) 


  3.64 MIL/MM3


(4.50-5.90)


 


Hemoglobin


  12.1 GM/DL


(13.0-17.0) 


  11.8 GM/DL


(13.0-17.0)


 


Hematocrit


  35.2 %


(39.0-51.0) 


  34.3 %


(39.0-51.0)


 


Neutrophils (%) (Auto)


  80.3 %


(16.0-70.0) 


  76.6 %


(16.0-70.0)


 


Monocytes (%) (Auto)


  9.2 %


(0.0-8.0) 


  10.3 %


(0.0-8.0)


 


Neutrophils # (Auto)


  9.5 TH/MM3


(1.8-7.7) 


  9.1 TH/MM3


(1.8-7.7)


 


Monocytes # (Auto)


  1.1 TH/MM3


(0-0.9) 


  1.2 TH/MM3


(0-0.9)


 


Random Glucose


  127 MG/DL


() 


  


 


 


Calcium Level


  8.4 MG/DL


(8.5-10.1) 


  


 


 


Urine Color


  


  [place]


(YELLW/STRAW) 


 


 


Urine Ketones  10 mg/dL (NEG)  


 


Urine Mucus  MOD /lpf (OCC)  








PE at Discharge


Objective Remarks


GENERAL: well nourished male no distress, sleepy


SKIN: Warm and dry.


HEAD: Normocephalic.


EYES: No scleral icterus. No injection or drainage. 


NECK: Supple, trachea midline. No JVD or lymphadenopathy.


CARDIOVASCULAR: Regular rate and rhythm without murmurs, gallops, or rubs. 


RESPIRATORY: Breath sounds equal bilaterally, diminished at base


Male - Penis Ecchymotic, no discharge, or palpable lumps


GASTROINTESTINAL: Abdomen soft,distended, + BS


MUSCULOSKELETAL: No cyanosis, or edema.


Hospital Course


s/p Right Intertroch hip fx s/p IMN -on 6/10.


Had post op confusion, evaluated by Neurology. CT head negative for acute 

process, EEG shoed mild encephalopathy and cardiac arrhythmia. 


He is on Xarelto for Afib rate controlled hx pacemaker.


Discharge Disposition:  Rehab Inpatient


Discharge Instructions


DIET: Follow Instructions for:  Heart Healthy Diet


Speech Therapy-Diet Recommenda:  Regular


Activities you can perform:  Weight Bearing as Elsie


Follow up Referrals:  


Orthopedics - 2 Weeks @ [facility] with Aidan García MD





New Medications:  


Calcium Carbonate-Vitamin D (Calcium 600+D 200) 600-200 Mg-Unit Tab


1 TAB PO BID for Nutritional Supplement, #60 TAB 0 Refills





Cholecalciferol (Vitamin D3) 2,000 Unit Cap


2000 UNITS PO DAILY for Nutritional Supplement, #60 CAP 0 Refills





Ergocalciferol (Ergocalciferol) 50,000 Unit Cap


29446 UNITS PO Q7D for Nutritional Supplement, #8 CAP





Hydrocodone-Acetaminophen (Hydrocodone-Acetaminophen) 7.5 Mg-325 Mg Tab


1 TAB PO Q4H PRN for PAIN, #60 TAB 0 Refills





Walker/Adult/Folding (Walker/Adult/Folding) 1 Mis Mis


EA .XX AS DIRECTED, #1 0 Refills





Acetaminophen-Codeine (Acetaminophen-Codeine) 300-30 mg Tab


1 TAB PO Q4H PRN for PAIN SCALE 1 TO 5, #30 TAB





Acetaminophen-Codeine (Acetaminophen-Codeine) 300-30 mg Tab


2 TAB PO Q4H PRN for PAIN SCALE 6 TO 10 for 30 Days, #360 TAB





Lorazepam (Ativan) 0.5 Mg Tab


0.5 MG PO Q8H PRN for MUSCLE SPASM, #30 TAB





Rivaroxaban (Xarelto) 20 Mg Tab


20 MG PO DAILY for blood clots for 30 Days, #30 TAB





 


Continued Medications:  


Bisacodyl Supp (Dulcolax Supp) 10 Mg Supp


10 MG RECTAL DAILY for Constipation, #12 SUPP 0 Refills





Cholecalciferol (Vitamin D3) 2,000 Unit Cap


2000 UNITS PO DAILY for Nutritional Supplement, #56 CAP 0 Refills





Cyanocobalamin (Vitamin B-12) 1,000 Mcg Tab


2000 MCG PO DAILY for Nutritional Supplement, #1 BOTTLE 0 Refills





Diltiazem ER 12 HR (Diltiazem ER 12 HR) 60 Mg Caper


60 MG PO DAILY, #60 CAP 0 Refills





Docusate Sodium (Stool Softener) 100 Mg Tab


100 MG PO DAILY for Constipation





Escitalopram (Lexapro) 20 Mg Tab


20 MG PO DAILY, #30 TAB 0 Refills





Furosemide (Lasix) 20 Mg Tab


20 MG PO DAILY AT 12NOON, #30 TAB 0 Refills





Latanoprost Opth Drops (Latanoprost Opth Drops) 0.005% Drops


1 DROP EACH EYE HS for Glaucoma, #2.5 ML 0 Refills


Refrigerate until opened.


Levobunolol Opth Drops (Levobunolol Opth Drops) 0.5% Drops


1 DROP EACH EYE BID for Glaucoma, #1 BOTTLE 0 Refills





Mirtazapine (Remeron) 15 Mg Tab


15 MG PO HS for Depression Control, #30 TAB 0 Refills





Potassium Chloride ER (Potassium Chloride ER) 10 Meq Tab


10 MEQ PO DAILY for Electrolyte Replacement, #30 TAB 0 Refills





Rivaroxaban (Xarelto) 20 Mg Tab


20 MG PO DAILY for Blood Clot Prevention, TAB 0 Refills





Thyroid ([place] Thyroid) 120 Mg Tab


135 MG PO DAILY for Thyroid Supplement, #30 TAB 0 Refills


Take 1 tablet (120mg) with a 15mg tablet for a total dose of 135mg


Thyroid ([place] Thyroid) 15 Mg Tab


135 MG PO DAILY for Thyroid Supplement, #30 TAB 0 Refills


Take 1 tablet (15mg) with a 120mg tablet for a total dose of 135mg


Vit C/E/Zn/Coppr/Lutein/Zeaxan (Preservision Areds 2 Softgel) 250-200-40 Capsule


1 CAP PO DAILY for Nutritional Supplement





 


Discontinued Medications:  


Furosemide (Lasix) 40 Mg Tab


40 MG PO DAILY IN THE AM, #30 TAB 0 Refills

















Ayesha Frye Jun 14, 2018 11:54

## 2018-06-14 NOTE — HHI.PR
Review/Management


Diagnosis/Plan:  


(1) Encephalopathy, metabolic


ICD Codes:  G93.41 - Metabolic encephalopathy


Status:  Acute


Plan:  Current postop


May relate a postanesthesia effect versus embolic infarcts


History of atrial fibrillation





Recommendations


reduce remeron to 7.5mg qhs;  a little groggy this am


EEG- minimal encephalopathy


Follow exam





(2) Pacemaker


ICD Codes:  Z95.0 - Presence of cardiac pacemaker


Status:  Acute


(3) Afib


ICD Codes:  I48.91 - Unspecified atrial fibrillation


Status:  Chronic


Plan:  Usually takes Xarelto daily





(4) Intertrochanteric fracture of right hip


ICD Codes:  S72.141A - Displaced intertrochanteric fracture of right femur, 

initial encounter for closed fracture


Status:  Acute


Plan:  Postop, or the following








Subjective


Subjective Comments


No acute events reported


No headache


No chest pain


No dyspnea


Active Medications





Current Medications








 Medications


  (Trade)  Dose


 Ordered  Sig/Dee


 Route  Start Time


 Stop Time Status Last Admin


 


  (NS Flush)  2 ml  UNSCH  PRN


 IV FLUSH  6/8/18 15:15


    6/11/18 04:48


 


 


  (Dulcolax Supp)  10 mg  DAILY


 RECTAL  6/9/18 09:00


    6/11/18 09:27


 


 


  (Vitamin D3)  2,000 units  DAILY


 PO  6/9/18 09:00


    6/14/18 08:46


 


 


  (Vitamin B12)  2,000 mcg  DAILY


 PO  6/9/18 09:00


    6/14/18 08:46


 


 


  (Lexapro)  20 mg  DAILY


 PO  6/9/18 09:00


    6/14/18 08:45


 


 


  (Lasix)  20 mg  DAILY


 PO  6/9/18 09:00


    6/14/18 08:45


 


 


  (Xalatan 0.005%


 Opth Soln)  1 drop  HS


 EACH EYE  6/8/18 21:00


    6/13/18 20:39


 


 


  (Betagan


 Liquifilm 0.5%)  1 drop  BID


 EACH EYE  6/8/18 21:00


    6/14/18 08:45


 


 


  (Remeron)  15 mg  HS


 PO  6/8/18 21:00


    6/13/18 20:39


 


 


  (KCl)  10 meq  DAILY


 PO  6/9/18 09:00


    6/14/18 08:46


 


 


 Patient Own


 Medication  PT OWN


 MED:


 DILTIA...  DAILY


 PO  6/9/18 09:00


   Future Hold  


 


 


  (Colace)  100 mg  DAILY


 PO  6/9/18 09:00


    6/14/18 08:46


 


 


 Patient Own


 Medication  PT OWN MED:


 PRESERVISION


 AREDS 2...  DAILY


 PO  6/9/18 09:00


   Future Hold  


 


 


  (Morphine Inj)  2 mg  Q4H  PRN


 IV  6/8/18 21:30


    6/10/18 16:00


 


 


  (Oscal-D 250-125)  250 mg  TID


 PO  6/10/18 09:00


    6/14/18 08:46


 


 


  (Ativan)  0.5 mg  Q8H  PRN


 PO  6/10/18 16:45


     


 


 


  (risperDAL)  0.25 mg  BID


 PO  6/11/18 21:00


    6/14/18 08:46


 


 


  (Tylenol-Codeine


 #3)  1 tab  Q4H  PRN


 PO  6/11/18 17:45


    6/13/18 20:39


 


 


  (Tylenol-Codeine


 #3)  2 tab  Q4H  PRN


 PO  6/11/18 17:45


     


 


 


  ([place] Thyroid)  135 mg  DAILY


 PO  6/12/18 09:00


    6/14/18 08:45


 


 


  (Pill Splitter)  1 ea  UNSCH  PRN


 OTHER  6/11/18 19:45


     


 


 


  (Xarelto)  20 mg  DAILY


 PO  6/12/18 16:00


    6/14/18 08:46


 








Allergies





Allergies


Coded Allergies


  fluoxetine (Unverified Allergy, Severe, hallucinations, 8/15/17)


  levobunolol (Unverified Allergy, Severe, UNKNOWN-ON PTS HISTORY AND PHYSICAL, 

8/15/17)


  tramadol (Unverified Allergy, Severe, hallucinations, 8/15/17)


  azithromycin (Unverified Allergy, Mild, rash, 8/15/17)





Review of Systems


All other ROS:  ROS reviewed as documented in chart





Exam


I&O / VS





Vital Signs








  Date Time  Temp Pulse Resp B/P (MAP) Pulse Ox O2 Delivery O2 Flow Rate FiO2


 


6/14/18 08:00 98.8 91 21 124/66 (85) 95   


 


6/14/18 07:53      Room Air  


 


6/14/18 00:01 97.9 99 18 110/55 (73) 94   


 


6/13/18 20:00 97.4 108 19 148/92 (110) 96   


 


6/13/18 16:00 98.9 95 18 158/74 (102) 95   


 


6/13/18 12:00 98.0 100 18 138/70 (92) 95   








General:  No acute distress


Eye:  EOMI


Respiratory:  Non-labored respirations


Neurologic:  Alert


Psychiatric:  Cooperative


Exam Comments


sleeping, arousable but can't sustain attention,  Can follow once a motor 

request. Extraocular movements intact left surgical cataract extraction change 

noted visual fields full no facial asymmetry all 4 extremity to gravity with 

the exception of reduced range of motion right upper extremity with abduction 

past shoulder and right lower extremity hip flexor paresis related to his 

recent fracture reflexes symmetric





Objective


Micro and Labs





Laboratory Tests








Test


  6/13/18


11:45


 


White Blood Count 11.9 


 


Red Blood Count 3.64 


 


Hemoglobin 11.8 


 


Hematocrit 34.3 


 


Mean Corpuscular Volume 94.3 


 


Mean Corpuscular Hemoglobin 32.4 


 


Mean Corpuscular Hemoglobin


Concent 34.4 


 


 


Red Cell Distribution Width 13.2 


 


Platelet Count 226 


 


Mean Platelet Volume 8.4 


 


Neutrophils (%) (Auto) 76.6 


 


Lymphocytes (%) (Auto) 12.3 


 


Monocytes (%) (Auto) 10.3 


 


Eosinophils (%) (Auto) 0.5 


 


Basophils (%) (Auto) 0.3 


 


Neutrophils # (Auto) 9.1 


 


Lymphocytes # (Auto) 1.5 


 


Monocytes # (Auto) 1.2 


 


Eosinophils # (Auto) 0.1 


 


Basophils # (Auto) 0.0 


 


CBC Comment DIFF FINAL 


 


Differential Comment  











Problem Qualifiers





(1) Afib:  


Qualified Codes:  I48.2 - Chronic atrial fibrillation


(2) Intertrochanteric fracture of right hip:  


Qualified Codes:  S72.144A - Nondisplaced intertrochanteric fracture of right 

femur, initial encounter for closed fracture








Dontae Martinez MD Jun 14, 2018 09:38